# Patient Record
Sex: FEMALE | Race: WHITE | NOT HISPANIC OR LATINO | Employment: FULL TIME | URBAN - METROPOLITAN AREA
[De-identification: names, ages, dates, MRNs, and addresses within clinical notes are randomized per-mention and may not be internally consistent; named-entity substitution may affect disease eponyms.]

---

## 2017-03-19 ENCOUNTER — COMMUNICATION - HEALTHEAST (OUTPATIENT)
Dept: INTERNAL MEDICINE | Facility: CLINIC | Age: 47
End: 2017-03-19

## 2017-03-19 DIAGNOSIS — F41.9 ANXIETY: ICD-10-CM

## 2017-04-22 ENCOUNTER — COMMUNICATION - HEALTHEAST (OUTPATIENT)
Dept: INTERNAL MEDICINE | Facility: CLINIC | Age: 47
End: 2017-04-22

## 2017-04-22 DIAGNOSIS — I10 ESSENTIAL HYPERTENSION: ICD-10-CM

## 2017-05-27 ENCOUNTER — COMMUNICATION - HEALTHEAST (OUTPATIENT)
Dept: INTERNAL MEDICINE | Facility: CLINIC | Age: 47
End: 2017-05-27

## 2017-05-27 DIAGNOSIS — I10 UNSPECIFIED ESSENTIAL HYPERTENSION: ICD-10-CM

## 2017-06-19 ENCOUNTER — COMMUNICATION - HEALTHEAST (OUTPATIENT)
Dept: INTERNAL MEDICINE | Facility: CLINIC | Age: 47
End: 2017-06-19

## 2017-06-19 DIAGNOSIS — F41.9 ANXIETY: ICD-10-CM

## 2017-07-13 ENCOUNTER — OFFICE VISIT - HEALTHEAST (OUTPATIENT)
Dept: INTERNAL MEDICINE | Facility: CLINIC | Age: 47
End: 2017-07-13

## 2017-07-13 DIAGNOSIS — F41.9 ANXIETY: ICD-10-CM

## 2017-07-13 DIAGNOSIS — E78.00 ELEVATED CHOLESTEROL: ICD-10-CM

## 2017-07-13 DIAGNOSIS — I10 ESSENTIAL HYPERTENSION: ICD-10-CM

## 2017-07-13 DIAGNOSIS — I10 UNSPECIFIED ESSENTIAL HYPERTENSION: ICD-10-CM

## 2017-07-13 LAB
CHOLEST SERPL-MCNC: 257 MG/DL
FASTING STATUS PATIENT QL REPORTED: ABNORMAL
HDLC SERPL-MCNC: 50 MG/DL
LDLC SERPL CALC-MCNC: 168 MG/DL
TRIGL SERPL-MCNC: 196 MG/DL

## 2017-08-31 ENCOUNTER — COMMUNICATION - HEALTHEAST (OUTPATIENT)
Dept: INTERNAL MEDICINE | Facility: CLINIC | Age: 47
End: 2017-08-31

## 2017-12-08 ENCOUNTER — COMMUNICATION - HEALTHEAST (OUTPATIENT)
Dept: INTERNAL MEDICINE | Facility: CLINIC | Age: 47
End: 2017-12-08

## 2017-12-08 DIAGNOSIS — F41.9 ANXIETY: ICD-10-CM

## 2018-03-07 ENCOUNTER — COMMUNICATION - HEALTHEAST (OUTPATIENT)
Dept: INTERNAL MEDICINE | Facility: CLINIC | Age: 48
End: 2018-03-07

## 2018-03-07 DIAGNOSIS — F41.9 ANXIETY: ICD-10-CM

## 2018-05-23 ENCOUNTER — HOSPITAL ENCOUNTER (OUTPATIENT)
Dept: MAMMOGRAPHY | Facility: CLINIC | Age: 48
Discharge: HOME | End: 2018-05-23
Attending: INTERNAL MEDICINE

## 2018-05-23 DIAGNOSIS — Z12.31 VISIT FOR SCREENING MAMMOGRAM: ICD-10-CM

## 2018-06-09 ENCOUNTER — COMMUNICATION - HEALTHEAST (OUTPATIENT)
Dept: INTERNAL MEDICINE | Facility: CLINIC | Age: 48
End: 2018-06-09

## 2018-06-09 DIAGNOSIS — F41.9 ANXIETY: ICD-10-CM

## 2018-06-29 ENCOUNTER — COMMUNICATION - HEALTHEAST (OUTPATIENT)
Dept: INTERNAL MEDICINE | Facility: CLINIC | Age: 48
End: 2018-06-29

## 2018-06-29 DIAGNOSIS — I10 ESSENTIAL HYPERTENSION: ICD-10-CM

## 2018-08-13 ENCOUNTER — RECORDS - HEALTHEAST (OUTPATIENT)
Dept: ADMINISTRATIVE | Facility: OTHER | Age: 48
End: 2018-08-13

## 2018-08-14 ENCOUNTER — COMMUNICATION - HEALTHEAST (OUTPATIENT)
Dept: INTERNAL MEDICINE | Facility: CLINIC | Age: 48
End: 2018-08-14

## 2018-08-14 DIAGNOSIS — I10 ESSENTIAL HYPERTENSION: ICD-10-CM

## 2018-09-02 ENCOUNTER — COMMUNICATION - HEALTHEAST (OUTPATIENT)
Dept: INTERNAL MEDICINE | Facility: CLINIC | Age: 48
End: 2018-09-02

## 2018-09-02 DIAGNOSIS — F41.9 ANXIETY: ICD-10-CM

## 2018-10-02 ENCOUNTER — COMMUNICATION - HEALTHEAST (OUTPATIENT)
Dept: INTERNAL MEDICINE | Facility: CLINIC | Age: 48
End: 2018-10-02

## 2018-10-02 DIAGNOSIS — I10 ESSENTIAL HYPERTENSION: ICD-10-CM

## 2018-11-07 ENCOUNTER — COMMUNICATION - HEALTHEAST (OUTPATIENT)
Dept: INTERNAL MEDICINE | Facility: CLINIC | Age: 48
End: 2018-11-07

## 2018-11-07 DIAGNOSIS — I10 ESSENTIAL HYPERTENSION: ICD-10-CM

## 2018-11-27 ENCOUNTER — COMMUNICATION - HEALTHEAST (OUTPATIENT)
Dept: INTERNAL MEDICINE | Facility: CLINIC | Age: 48
End: 2018-11-27

## 2018-11-27 DIAGNOSIS — F41.9 ANXIETY: ICD-10-CM

## 2018-12-12 ENCOUNTER — OFFICE VISIT - HEALTHEAST (OUTPATIENT)
Dept: INTERNAL MEDICINE | Facility: CLINIC | Age: 48
End: 2018-12-12

## 2018-12-12 DIAGNOSIS — I10 ESSENTIAL HYPERTENSION: ICD-10-CM

## 2018-12-12 DIAGNOSIS — F41.9 ANXIETY: ICD-10-CM

## 2018-12-12 DIAGNOSIS — E55.9 VITAMIN D DEFICIENCY: ICD-10-CM

## 2018-12-12 DIAGNOSIS — Z00.00 ROUTINE GENERAL MEDICAL EXAMINATION AT A HEALTH CARE FACILITY: ICD-10-CM

## 2018-12-12 DIAGNOSIS — Z82.49 FAMILY HISTORY OF ABDOMINAL AORTIC ANEURYSM (AAA): ICD-10-CM

## 2018-12-12 DIAGNOSIS — Z88.9 MULTIPLE ALLERGIES: ICD-10-CM

## 2018-12-12 DIAGNOSIS — E66.3 OVERWEIGHT (BMI 25.0-29.9): ICD-10-CM

## 2018-12-12 DIAGNOSIS — E78.2 MIXED HYPERLIPIDEMIA: ICD-10-CM

## 2018-12-12 ASSESSMENT — MIFFLIN-ST. JEOR: SCORE: 1401.85

## 2018-12-13 ENCOUNTER — HOSPITAL ENCOUNTER (OUTPATIENT)
Dept: ULTRASOUND IMAGING | Facility: CLINIC | Age: 48
Discharge: HOME | End: 2018-12-13
Attending: INTERNAL MEDICINE

## 2018-12-13 DIAGNOSIS — Z82.49 FAMILY HISTORY OF ABDOMINAL AORTIC ANEURYSM (AAA): ICD-10-CM

## 2019-01-04 ENCOUNTER — AMBULATORY - HEALTHEAST (OUTPATIENT)
Dept: NURSING | Facility: CLINIC | Age: 49
End: 2019-01-04

## 2019-01-05 ENCOUNTER — COMMUNICATION - HEALTHEAST (OUTPATIENT)
Dept: INTERNAL MEDICINE | Facility: CLINIC | Age: 49
End: 2019-01-05

## 2019-01-22 ENCOUNTER — OFFICE VISIT - HEALTHEAST (OUTPATIENT)
Dept: FAMILY MEDICINE | Facility: CLINIC | Age: 49
End: 2019-01-22

## 2019-01-22 ENCOUNTER — AMBULATORY - HEALTHEAST (OUTPATIENT)
Dept: LAB | Facility: CLINIC | Age: 49
End: 2019-01-22

## 2019-01-22 DIAGNOSIS — I10 ESSENTIAL HYPERTENSION: ICD-10-CM

## 2019-01-22 DIAGNOSIS — E55.9 VITAMIN D DEFICIENCY: ICD-10-CM

## 2019-01-22 DIAGNOSIS — E78.2 MIXED HYPERLIPIDEMIA: ICD-10-CM

## 2019-01-22 DIAGNOSIS — Z00.00 ROUTINE GENERAL MEDICAL EXAMINATION AT A HEALTH CARE FACILITY: ICD-10-CM

## 2019-01-22 DIAGNOSIS — E66.3 OVERWEIGHT (BMI 25.0-29.9): ICD-10-CM

## 2019-01-22 DIAGNOSIS — R63.5 ABNORMAL WEIGHT GAIN: ICD-10-CM

## 2019-01-22 DIAGNOSIS — Z71.9 HEALTH EDUCATION: ICD-10-CM

## 2019-01-22 LAB
ALBUMIN SERPL-MCNC: 4.2 G/DL (ref 3.5–5)
ALP SERPL-CCNC: 64 U/L (ref 45–120)
ALT SERPL W P-5'-P-CCNC: 26 U/L (ref 0–45)
ANION GAP SERPL CALCULATED.3IONS-SCNC: 15 MMOL/L (ref 5–18)
AST SERPL W P-5'-P-CCNC: 19 U/L (ref 0–40)
BILIRUB SERPL-MCNC: 0.5 MG/DL (ref 0–1)
BUN SERPL-MCNC: 17 MG/DL (ref 8–22)
CALCIUM SERPL-MCNC: 9.7 MG/DL (ref 8.5–10.5)
CHLORIDE BLD-SCNC: 97 MMOL/L (ref 98–107)
CHOLEST SERPL-MCNC: 277 MG/DL
CO2 SERPL-SCNC: 26 MMOL/L (ref 22–31)
CREAT SERPL-MCNC: 0.69 MG/DL (ref 0.6–1.1)
FASTING STATUS PATIENT QL REPORTED: YES
GFR SERPL CREATININE-BSD FRML MDRD: >60 ML/MIN/1.73M2
GLUCOSE BLD-MCNC: 87 MG/DL (ref 70–125)
HBA1C MFR BLD: 5.6 % (ref 3.5–6)
HDLC SERPL-MCNC: 63 MG/DL
LDLC SERPL CALC-MCNC: 191 MG/DL
POTASSIUM BLD-SCNC: 3.4 MMOL/L (ref 3.5–5)
PROT SERPL-MCNC: 7.3 G/DL (ref 6–8)
SODIUM SERPL-SCNC: 138 MMOL/L (ref 136–145)
TRIGL SERPL-MCNC: 113 MG/DL
TSH SERPL DL<=0.005 MIU/L-ACNC: 2.4 UIU/ML (ref 0.3–5)
VIT B12 SERPL-MCNC: 343 PG/ML (ref 213–816)

## 2019-01-22 ASSESSMENT — MIFFLIN-ST. JEOR: SCORE: 1401.96

## 2019-01-23 LAB
25(OH)D3 SERPL-MCNC: 34 NG/ML (ref 30–80)
25(OH)D3 SERPL-MCNC: 34 NG/ML (ref 30–80)

## 2019-02-05 ENCOUNTER — COMMUNICATION - HEALTHEAST (OUTPATIENT)
Dept: INTERNAL MEDICINE | Facility: CLINIC | Age: 49
End: 2019-02-05

## 2019-02-05 DIAGNOSIS — I10 ESSENTIAL HYPERTENSION: ICD-10-CM

## 2019-02-08 ENCOUNTER — COMMUNICATION - HEALTHEAST (OUTPATIENT)
Dept: FAMILY MEDICINE | Facility: CLINIC | Age: 49
End: 2019-02-08

## 2019-02-25 ENCOUNTER — OFFICE VISIT - HEALTHEAST (OUTPATIENT)
Dept: FAMILY MEDICINE | Facility: CLINIC | Age: 49
End: 2019-02-25

## 2019-02-25 DIAGNOSIS — I10 ESSENTIAL HYPERTENSION: ICD-10-CM

## 2019-02-25 DIAGNOSIS — E78.2 MIXED HYPERLIPIDEMIA: ICD-10-CM

## 2019-02-25 ASSESSMENT — MIFFLIN-ST. JEOR: SCORE: 1405.59

## 2019-04-01 ENCOUNTER — OFFICE VISIT - HEALTHEAST (OUTPATIENT)
Dept: FAMILY MEDICINE | Facility: CLINIC | Age: 49
End: 2019-04-01

## 2019-04-01 DIAGNOSIS — E78.2 MIXED HYPERLIPIDEMIA: ICD-10-CM

## 2019-04-01 DIAGNOSIS — I10 ESSENTIAL HYPERTENSION: ICD-10-CM

## 2019-04-01 ASSESSMENT — MIFFLIN-ST. JEOR: SCORE: 1376.1

## 2019-10-16 ENCOUNTER — COMMUNICATION - HEALTHEAST (OUTPATIENT)
Dept: FAMILY MEDICINE | Facility: CLINIC | Age: 49
End: 2019-10-16

## 2019-11-21 ENCOUNTER — COMMUNICATION - HEALTHEAST (OUTPATIENT)
Dept: INTERNAL MEDICINE | Facility: CLINIC | Age: 49
End: 2019-11-21

## 2019-11-21 DIAGNOSIS — F41.9 ANXIETY: ICD-10-CM

## 2019-11-23 ENCOUNTER — COMMUNICATION - HEALTHEAST (OUTPATIENT)
Dept: INTERNAL MEDICINE | Facility: CLINIC | Age: 49
End: 2019-11-23

## 2019-11-23 DIAGNOSIS — I10 ESSENTIAL HYPERTENSION: ICD-10-CM

## 2019-11-25 RX ORDER — LISINOPRIL 10 MG/1
TABLET ORAL
Qty: 90 TABLET | Refills: 3 | Status: SHIPPED | OUTPATIENT
Start: 2019-11-25

## 2020-01-16 ENCOUNTER — COMMUNICATION - HEALTHEAST (OUTPATIENT)
Dept: FAMILY MEDICINE | Facility: CLINIC | Age: 50
End: 2020-01-16

## 2020-01-27 ENCOUNTER — COMMUNICATION - HEALTHEAST (OUTPATIENT)
Dept: INTERNAL MEDICINE | Facility: CLINIC | Age: 50
End: 2020-01-27

## 2020-01-27 ENCOUNTER — OFFICE VISIT - HEALTHEAST (OUTPATIENT)
Dept: SLEEP MEDICINE | Facility: CLINIC | Age: 50
End: 2020-01-27

## 2020-01-27 DIAGNOSIS — E66.09 CLASS 1 OBESITY DUE TO EXCESS CALORIES WITHOUT SERIOUS COMORBIDITY WITH BODY MASS INDEX (BMI) OF 31.0 TO 31.9 IN ADULT: ICD-10-CM

## 2020-01-27 DIAGNOSIS — I10 BENIGN ESSENTIAL HYPERTENSION: ICD-10-CM

## 2020-01-27 DIAGNOSIS — E66.811 CLASS 1 OBESITY DUE TO EXCESS CALORIES WITHOUT SERIOUS COMORBIDITY WITH BODY MASS INDEX (BMI) OF 31.0 TO 31.9 IN ADULT: ICD-10-CM

## 2020-01-27 DIAGNOSIS — F41.9 ANXIETY: ICD-10-CM

## 2020-01-27 DIAGNOSIS — G25.81 RESTLESS LEGS SYNDROME: ICD-10-CM

## 2020-01-27 DIAGNOSIS — I10 ESSENTIAL HYPERTENSION: ICD-10-CM

## 2020-01-27 DIAGNOSIS — R06.83 SNORING: ICD-10-CM

## 2020-01-27 DIAGNOSIS — G47.10 HYPERSOMNIA, UNSPECIFIED: ICD-10-CM

## 2020-01-27 ASSESSMENT — MIFFLIN-ST. JEOR: SCORE: 1421.01

## 2020-01-28 ENCOUNTER — COMMUNICATION - HEALTHEAST (OUTPATIENT)
Dept: FAMILY MEDICINE | Facility: CLINIC | Age: 50
End: 2020-01-28

## 2020-01-28 ENCOUNTER — COMMUNICATION - HEALTHEAST (OUTPATIENT)
Dept: INTERNAL MEDICINE | Facility: CLINIC | Age: 50
End: 2020-01-28

## 2020-01-28 ENCOUNTER — AMBULATORY - HEALTHEAST (OUTPATIENT)
Dept: INTERNAL MEDICINE | Facility: CLINIC | Age: 50
End: 2020-01-28

## 2020-01-28 DIAGNOSIS — I10 ESSENTIAL HYPERTENSION: ICD-10-CM

## 2020-01-28 DIAGNOSIS — F41.9 ANXIETY: ICD-10-CM

## 2020-01-28 RX ORDER — VENLAFAXINE HYDROCHLORIDE 150 MG/1
150 CAPSULE, EXTENDED RELEASE ORAL DAILY
Qty: 90 CAPSULE | Refills: 3 | Status: ON HOLD | OUTPATIENT
Start: 2020-01-28 | End: 2024-07-30

## 2020-01-28 RX ORDER — CHLORTHALIDONE 25 MG/1
25 TABLET ORAL DAILY
Qty: 90 TABLET | Refills: 3 | Status: SHIPPED | OUTPATIENT
Start: 2020-01-28

## 2020-03-02 ENCOUNTER — RECORDS - HEALTHEAST (OUTPATIENT)
Dept: SLEEP MEDICINE | Facility: CLINIC | Age: 50
End: 2020-03-02

## 2020-03-02 ENCOUNTER — RECORDS - HEALTHEAST (OUTPATIENT)
Dept: ADMINISTRATIVE | Facility: OTHER | Age: 50
End: 2020-03-02

## 2020-03-02 DIAGNOSIS — G47.10 HYPERSOMNIA, UNSPECIFIED: ICD-10-CM

## 2020-03-02 DIAGNOSIS — R06.83 SNORING: ICD-10-CM

## 2020-03-06 ENCOUNTER — COMMUNICATION - HEALTHEAST (OUTPATIENT)
Dept: SLEEP MEDICINE | Facility: CLINIC | Age: 50
End: 2020-03-06

## 2020-03-09 ENCOUNTER — COMMUNICATION - HEALTHEAST (OUTPATIENT)
Dept: SLEEP MEDICINE | Facility: CLINIC | Age: 50
End: 2020-03-09

## 2020-03-09 DIAGNOSIS — G47.33 OBSTRUCTIVE SLEEP APNEA: ICD-10-CM

## 2020-03-12 DIAGNOSIS — G47.33 OSA (OBSTRUCTIVE SLEEP APNEA): Primary | ICD-10-CM

## 2020-03-16 DIAGNOSIS — G47.33 OBSTRUCTIVE SLEEP APNEA: Primary | ICD-10-CM

## 2020-04-01 ENCOUNTER — COMMUNICATION - HEALTHEAST (OUTPATIENT)
Dept: SLEEP MEDICINE | Facility: CLINIC | Age: 50
End: 2020-04-01

## 2020-04-01 DIAGNOSIS — G47.33 OBSTRUCTIVE SLEEP APNEA: ICD-10-CM

## 2020-05-06 ENCOUNTER — OFFICE VISIT - HEALTHEAST (OUTPATIENT)
Dept: SLEEP MEDICINE | Facility: CLINIC | Age: 50
End: 2020-05-06

## 2020-05-06 DIAGNOSIS — G47.10 HYPERSOMNIA: ICD-10-CM

## 2020-05-06 DIAGNOSIS — G47.33 OBSTRUCTIVE SLEEP APNEA: ICD-10-CM

## 2020-05-07 ENCOUNTER — AMBULATORY - HEALTHEAST (OUTPATIENT)
Dept: SLEEP MEDICINE | Facility: CLINIC | Age: 50
End: 2020-05-07

## 2021-05-27 NOTE — PROGRESS NOTES
ASSESSMENT:  1. Hypertension     2. Mixed hyperlipidemia     3. BMI 30.0-30.9,adult           PLAN:  Cuca is seen results and is feeling like she is trying to get on track.  We discussed the importance of exercise and a combination of resistance and cardiovascular exercise and starting to incorporate that in in the evidence basis for that.  We discussed focusing on adequate protein low carbohydrate diet but making sure that she has enough variability and especially high-fiber foods such as vegetables and some fruit.F/U in one month.  Recommend increasing movement throughout the day--sitting less, moving more.  Will increase activity over time to reach a goal of at least 150 minutes of moderate exercise each week.  Behavior modification:  Cognitive restructuring exercises, journaling stressors, triggers for food cravings.  Dietary Intervention:  Reduced calorie, reduced carbohydrates, whole food diet.  Greater than 50% of this 15 minute visit was spent in counseling regarding patient's obesity, medications, dietary, exercise, and behavior modification recommendations.      SUBJECTIVE  Patient presents for treatment of chronic, comorbid conditions using intensive therapeutic lifestyle interventions including nutrition, physical activity, and behavior management.  The patient had a much more successful past month with weight loss.  She lost just over 3% of her weight this month.  She is very pleased with this.  The patient continues to feel that her appetite is under good control and she is not having excessive cravings.  She does not feel that medication would be beneficial.  She feels she is getting into a routine both with nutrition and exercise.    Are you experiencing any side effects to the medications:  N/A  Hunger control:  good  Exercise was discussed: yes  Taking supplements:  yes  Discussed journaling food:  yes  Patient is pleased with the current results:  yes  The patient is following the nutrition  plan:  yes  Barriers to losing weight:  None identified    Patient Active Problem List   Diagnosis     Iron Deficiency Anemia Secondary To Inadequate Dietary Iron Intake     Eczema     Insomnia     Perimenopause     Arthritis     Hypertension     Mixed hyperlipidemia     Vitamin D Deficiency     Allergies     Chronic Sinusitis     Anxiety     BMI 30.0-30.9,adult       Current Outpatient Medications on File Prior to Visit   Medication Sig Dispense Refill     b complex vitamins tablet Take 1 tablet by mouth daily.       chlorthalidone (HYGROTEN) 25 MG tablet Take 1 tablet (25 mg total) by mouth daily. 90 tablet 3     ERGOCALCIFEROL, VITAMIN D2, (VITAMIN D2 ORAL) Take 1 capsule by mouth daily.       lisinopril (PRINIVIL,ZESTRIL) 10 MG tablet Take 1 tablet (10 mg total) by mouth daily. 90 tablet 3     MULTIVITAMIN ORAL Take 1 tablet by mouth daily.       OMEGA-3S/DHA/EPA/FISH OIL (OMEGA 3 ORAL) Take 1 capsule by mouth daily.       venlafaxine (EFFEXOR-XR) 150 MG 24 hr capsule Take 1 capsule (150 mg total) by mouth daily. 90 capsule 3     No current facility-administered medications on file prior to visit.        The following portions of the patient's history were reviewed and updated as appropriate: allergies, current medications, past family history, past medical history, past social history, past surgical history and problem list.    Review of Systems  Pertinent items are noted in HPI.        OBJECTIVE:  Vitals:    04/01/19 1600   BP: 122/74     Initial Weight: 176.8 lbs  Weight: 171 lb 1.6 oz (77.6 kg)  Weight loss from initial: 5.7  % Weight loss: 3.22 %    Body mass index is 29.37 kg/m .  General:  Patient is alert, pleasant, no distress.  Patient is obese.       .  This note has been dictated using voice recognition software. Any grammatical or context distortions are unintentional and inherent to the software

## 2021-05-30 ENCOUNTER — RECORDS - HEALTHEAST (OUTPATIENT)
Dept: ADMINISTRATIVE | Facility: CLINIC | Age: 51
End: 2021-05-30

## 2021-05-31 VITALS — WEIGHT: 177.44 LBS | BODY MASS INDEX: 29.99 KG/M2

## 2021-06-02 ENCOUNTER — RECORDS - HEALTHEAST (OUTPATIENT)
Dept: ADMINISTRATIVE | Facility: CLINIC | Age: 51
End: 2021-06-02

## 2021-06-02 VITALS — BODY MASS INDEX: 30.19 KG/M2 | HEIGHT: 64 IN | WEIGHT: 176.8 LBS

## 2021-06-02 VITALS — HEIGHT: 64 IN | WEIGHT: 171.1 LBS | BODY MASS INDEX: 29.21 KG/M2

## 2021-06-02 VITALS — WEIGHT: 177.6 LBS | HEIGHT: 64 IN | BODY MASS INDEX: 30.32 KG/M2

## 2021-06-02 VITALS — WEIGHT: 175.9 LBS | HEIGHT: 64 IN | BODY MASS INDEX: 30.03 KG/M2

## 2021-06-03 NOTE — TELEPHONE ENCOUNTER
Former patient of Dr RICHELLE Flores & has not established care with another provider.  Please assign refill request to covering provider per Clinic standard process.-p    Refill Approved/Needs MD authorization    Rx renewed per Medication Renewal Policy. Medication was last renewed on 12/12/2018 with 3 refills.  Last office visit: 4/1/2019 with Dr JUDAH Reese @ [facility] (weight loss program)  Last office visit with PCP Dr RICHELLE Flores = 12/12/2018, no longer with [facility] IM clinic.   Will forward request to [facility] IM clinic.     Margie Mcdonnell, Care Connection Triage/Med Refill 11/21/2019     Requested Prescriptions   Pending Prescriptions Disp Refills     venlafaxine (EFFEXOR-XR) 150 MG 24 hr capsule [Pharmacy Med Name: VENLAFAXINE ER 150MG CAPSULES] 90 capsule 0     Sig: TAKE ONE CAPSULE BY MOUTH DAILY       Venlafaxine/Desvenlafaxine Refill Protocol Passed - 11/21/2019  3:28 AM        Passed - LFT or AST or ALT in last year     Albumin   Date Value Ref Range Status   01/22/2019 4.2 3.5 - 5.0 g/dL Final     Bilirubin, Total   Date Value Ref Range Status   01/22/2019 0.5 0.0 - 1.0 mg/dL Final     Alkaline Phosphatase   Date Value Ref Range Status   01/22/2019 64 45 - 120 U/L Final     AST   Date Value Ref Range Status   01/22/2019 19 0 - 40 U/L Final     ALT   Date Value Ref Range Status   01/22/2019 26 0 - 45 U/L Final     Protein, Total   Date Value Ref Range Status   01/22/2019 7.3 6.0 - 8.0 g/dL Final                Passed - Fasting lipid cascade in last year     Cholesterol   Date Value Ref Range Status   01/22/2019 277 (H) <=199 mg/dL Final     Triglycerides   Date Value Ref Range Status   01/22/2019 113 <=149 mg/dL Final     HDL Cholesterol   Date Value Ref Range Status   01/22/2019 63 >=50 mg/dL Final     LDL Calculated   Date Value Ref Range Status   01/22/2019 191 (H) <=129 mg/dL Final     Patient Fasting > 8hrs?   Date Value Ref Range Status   01/22/2019 Yes  Final             Passed - PCP or prescribing provider visit in  last year     Last office visit with prescriber/PCP: 7/13/2017 Alicia Flores MD OR same dept: Visit date not found OR same specialty: 7/13/2017 Alicia Flores MD  Last physical: 12/12/2018 Last MTM visit: Visit date not found   Next visit within 3 mo: Visit date not found  Next physical within 3 mo: Visit date not found  Prescriber OR PCP: Alicia Flores MD  Last diagnosis associated with med order: 1. Anxiety  - venlafaxine (EFFEXOR-XR) 150 MG 24 hr capsule [Pharmacy Med Name: VENLAFAXINE ER 150MG CAPSULES]; TAKE ONE CAPSULE BY MOUTH DAILY  Dispense: 90 capsule; Refill: 0    If protocol passes may refill for 12 months if within 3 months of last provider visit (or a total of 15 months).             Passed - Blood Pressure in last year     BP Readings from Last 1 Encounters:   04/01/19 122/74

## 2021-06-03 NOTE — TELEPHONE ENCOUNTER
Refill Approved    Rx renewed per Medication Renewal Policy. Medication was last renewed on 12/12/18  OV 4/1/19.    Isabel Izquierdo, Care Connection Triage/Med Refill 11/23/2019     Requested Prescriptions   Pending Prescriptions Disp Refills     lisinopril (PRINIVIL,ZESTRIL) 10 MG tablet [Pharmacy Med Name: LISINOPRIL 10MG TABLETS] 90 tablet 0     Sig: TAKE 1 TABLET(10 MG) BY MOUTH DAILY       Ace Inhibitors Refill Protocol Passed - 11/23/2019 10:46 AM        Passed - PCP or prescribing provider visit in past 12 months       Last office visit with prescriber/PCP: 7/13/2017 Alicia Flores MD OR same dept: Visit date not found OR same specialty: 7/13/2017 Alicia Flores MD  Last physical: 12/12/2018 Last MTM visit: Visit date not found   Next visit within 3 mo: Visit date not found  Next physical within 3 mo: Visit date not found  Prescriber OR PCP: Alicia Flores MD  Last diagnosis associated with med order: 1. Essential hypertension  - lisinopril (PRINIVIL,ZESTRIL) 10 MG tablet [Pharmacy Med Name: LISINOPRIL 10MG TABLETS]; TAKE 1 TABLET(10 MG) BY MOUTH DAILY  Dispense: 90 tablet; Refill: 0    If protocol passes may refill for 12 months if within 3 months of last provider visit (or a total of 15 months).             Passed - Serum Potassium in past 12 months     Lab Results   Component Value Date    Potassium 3.4 (L) 01/22/2019             Passed - Blood pressure filed in past 12 months     BP Readings from Last 1 Encounters:   04/01/19 122/74             Passed - Serum Creatinine in past 12 months     Creatinine   Date Value Ref Range Status   01/22/2019 0.69 0.60 - 1.10 mg/dL Final

## 2021-06-04 VITALS
BODY MASS INDEX: 30.9 KG/M2 | OXYGEN SATURATION: 99 % | DIASTOLIC BLOOD PRESSURE: 89 MMHG | SYSTOLIC BLOOD PRESSURE: 129 MMHG | WEIGHT: 181 LBS | HEART RATE: 80 BPM | HEIGHT: 64 IN

## 2021-06-05 NOTE — TELEPHONE ENCOUNTER
Former patient of Dr. Alicia Flores & has not established care with another provider.  Please assign refill request to covering provider per Clinic standard process.

## 2021-06-05 NOTE — PATIENT INSTRUCTIONS - HE
Supplemental iron therapy    I recommend you start taking supplemental iron.  This can be purchased over the counter and is typically called ferrous sulfate or ferrous gluconate, 325 mg tabs (65 mg elemental iron).  For maximal absorption, take one tablet on an empty stomach with 500 mg vitamin C (or a cup of orange juice).

## 2021-06-05 NOTE — PROGRESS NOTES
[facility]  Outpatient Sleep Medicine Consultation      Name: Cuca Sun        MRN# [id number]  Age: 49 y.o.         YOB: 1970    Date of Consultation: 1/27/2020  Consultation is requested by: Alicia Flores MD  [address] [place] [address]  Primary care provider: Alicia Flores MD        Assessment and Plan:  1. Snoring  Patient is being evaluated for Obstructive Sleep Apnea (MECHELLE).  Risk factors for MECHELLE include:  snoring, excessive daytime sleepiness/subjective tiredness, high blood pressure, obesity (STOP BANG score = 4/8).  Recommend HST since patient is high risk for MECHELLE without any relevant comorbid conditions.  Counseling included a comprehensive review of diagnostic and therapeutic strategies as well as risks of inadequate therapy.  [facility]    2. Restless legs syndrome  Patient declines prescription medication for restless leg syndrome.  I discussed the possibility that periodic limb movement disorder may contribute to sleep fragmentation and daytime sleepiness.  The home sleep apnea test cannot assess for leg movements and associated arousals.  An in lab polysomnogram study may be considered for persistent sleepiness.  In the meantime, I recommended supplemental iron for several months to determine if this improves her symptoms.    3. Hypersomnia, unspecified  The patient was strongly advised to avoid driving, operating any heavy machinery or engaging in other hazardous situations while drowsy or sleepy.  Patient was counseled on the importance of driving while alert and to pull over if drowsy.      4. Benign essential hypertension  MECHELLE is an independent risk factor for hypertension; risk for hypertension is related to severity of sleep apnea and episodic nocturnal hypoxemia.  Untreated MECHELLE can cause loss of blood pressure dipping during nighttime sleep.  PAP therapy can improve blood pressure  control in individuals with co-existing MECHELLE and hypertension.    5. Class 1 obesity due to excess calories without serious comorbidity with body mass index (BMI) of 31.0 to 31.9 in adult  Patients with obesity are at higher risk for sleep apnea due to fat deposition in the posterior airway and tongue.  Sleep disruption associated with untreated sleep disorders (including, but not limited to sleep apnea) can cause hormonal disruption that predisposes individuals to gain weight.  Weight loss can lead to improvement in sleep apnea severity and sleep quality.  Nocturnal hypoventilation may be a consideration in some individuals with obesity.    Chief Complaint: Daytime sleepiness      History of Present Illness:    Cuca Sun is a 49 y.o. female who I am seeing for the first time in my adult sleep medicine clinic.  She reports being evaluated approximately 4 years ago for daytime sleepiness.  At that time she deferred testing for sleep disorders due to her desire to lose weight.  She has not been able to achieve her weight loss goals and her sleep symptoms have persisted.  She endorses snoring, daytime sleepiness, and unrefreshing sleep.  Her score on the [place] Sleepiness Scale is elevated at 11 out of 24.    Typical bedtime is between 9 PM and midnight.  Sleep initiation is variable from 10 minutes to 1 hour.  Awakenings are usually 1-2 times per night.  Her final wake up time is at 5:40 AM on weekdays and 7:53 AM on weekends.  She describes her sleep quality as okay.  She consumes 2 cups of coffee daily.  She has difficulty with sleep maintenance.  When she awakens she has the feeling that her airway is swollen.  She endorses difficulty with lying supine and undergoing a cosmetic procedure such as eyebrow waxing.    She describes a clinical history consistent with a diagnosis of restless leg syndrome.  She has a bothersome sensation in her legs which is more prominent at night.  Stretching or holding an  isotonic pose helps alleviate the sensation.  Remaining still causes the sensation to escalate.  These feelings were worse when she was pregnant and she noticed them on long car rides.  She does not want medications for relief since behavioral mechanisms seem to be managing the condition adequately.  She does report a history of mild anemia.    She is diagnosed with hypertension and takes lisinopril.  She reports that her blood pressures have been controlled.  Anxiety is managed with Effexor.  She is engaging in a weight loss program.  Surgical history includes 2 surgeries on her knee and a laparoscopic cholecystectomy.  There were no complications reported in the perioperative period.            Medications:      Current Outpatient Medications   Medication Sig Dispense Refill     b complex vitamins tablet Take 1 tablet by mouth daily.       chlorthalidone (HYGROTEN) 25 MG tablet Take 1 tablet (25 mg total) by mouth daily. 90 tablet 3     ERGOCALCIFEROL, VITAMIN D2, (VITAMIN D2 ORAL) Take 1 capsule by mouth daily.       lisinopril (PRINIVIL,ZESTRIL) 10 MG tablet TAKE 1 TABLET(10 MG) BY MOUTH DAILY 90 tablet 3     MULTIVITAMIN ORAL Take 1 tablet by mouth daily.       OMEGA-3S/DHA/EPA/FISH OIL (OMEGA 3 ORAL) Take 1 capsule by mouth daily.       venlafaxine (EFFEXOR-XR) 150 MG 24 hr capsule Take 1 capsule (150 mg total) by mouth daily. 90 capsule 3     venlafaxine (EFFEXOR-XR) 150 MG 24 hr capsule TAKE ONE CAPSULE BY MOUTH DAILY 30 capsule 0     No current facility-administered medications for this visit.          Allergies   Allergen Reactions     Morphine Nausea Only         Past Medical History:    No past medical history on file.      Past Surgical History:    Past Surgical History:   Procedure Laterality Date     WA KNEE SCOPE,DIAGNOSTIC      Description: Arthroscopy Knee Left;  Recorded: 03/25/2009;  Comments: 6-87 [facility] SAXENA LAP,CHOLECYSTECTOMY      Description: Cholecystectomy Laparoscopic;   "Recorded: 03/25/2009;  Comments: 6-07 [facility]         Social History:    Social History     Tobacco Use     Smoking status: Never Smoker     Smokeless tobacco: Never Used   Substance Use Topics     Alcohol use: Not on file         Family History:    Family History   Problem Relation Age of Onset     Breast cancer Mother 60     Aortic aneurysm Mother         arch that was repaired, abdomen rupture on eliquis that was fatal     Atrial fibrillation Mother          Review of Systems:    A complete 10 point review of systems was negative other than HPI or as commented below.      Physical Examination:  /89 (Patient Site: Right Arm, Patient Position: Sitting, Cuff Size: Adult Large)   Pulse 80   Ht 5' 4\" (1.626 m)   Wt 181 lb (82.1 kg)   SpO2 99%   BMI 31.07 kg/m    Neck circumference: 15.5 inches  Constitutional: . Awake, alert, cooperative, in no apparent distress.  Eyes: No icterus.  ENT: Mallampati Class: III.  Tongue:  Large tongue base.  Nose: Nares patent.    Cardiovascular: Regular S1 and S2, no gallops or murmurs.  Neck: Supple, no thyroid enlargement.  Pulmonary:  Chest symmetric, lungs clear bilaterally and no crackles, wheezes or rales.  Extremities:  No pretibial edema.  Skin:  No rash or significant lesions visible.  Gait:  Normal.  Neurologic: Alert, oriented, no focal neurological deficit.  Psychological: Euthymic; affect appropriate.    Data: All pertinent previous laboratory data reviewed.    No results found for: PH  Lab Results   Component Value Date    TSH 2.40 01/22/2019    TSH 1.80 07/13/2017     No components found for: GLC  Lab Results   Component Value Date    HGB 13.2 08/13/2015    HGB 12.9 04/30/2014     Lab Results   Component Value Date    BUN 17 01/22/2019    BUN 17 07/13/2017     Lab Results   Component Value Date    AST 19 01/22/2019    AST 16 07/13/2017    ALT 26 01/22/2019    ALT 18 07/13/2017    ALKPHOS 64 01/22/2019    ALKPHOS 61 07/13/2017     No components found for: " MARIAN, DEBORAH, KEELY MONREAL, UCOC, OPIT, UPCP      Shara Deleon MD  1/27/2020  [place] [address] [facility], [address]  [place]  [address]  [phone]    Copy to: Alicia Flores MD

## 2021-06-05 NOTE — TELEPHONE ENCOUNTER
I am still not quite sure what to do with this.  As a consultant, filling chronic medications as not considered something that I would do.  This should be sent to whoever is covering for Dr. Alicia Flores.

## 2021-06-05 NOTE — TELEPHONE ENCOUNTER
Spoke to patient over the phone regarding a letter that had been mailed out a few months ago regarding new resources available through the Weight Management Program.  Patient notes that she did receive the letter and had no questions at this time regarding resources available.

## 2021-06-05 NOTE — PROGRESS NOTES
Sleep study order, face sheet, signed office visit note, insurance card, photo ID, and consent for service form have been sent to [facility] today 1/27/2020 8:56 AM

## 2021-06-05 NOTE — TELEPHONE ENCOUNTER
Please reach out to patient; she has Dr. Alicia Flores listed as her primary; however, her medication came to me to fill. Has she chosen a new primary? I last saw patient in April for weight loss

## 2021-06-06 NOTE — TELEPHONE ENCOUNTER
Please call the patient to schedule. Please create chart and send to me. Order is in the chart. Thanks.

## 2021-06-07 NOTE — TELEPHONE ENCOUNTER
Order for Durable Medical Equipment was processed and equipment ordered.     DME provider: [facility]    Date Faxed: 4/1/2020    Ordering Provider: Aayush Rodriguez DO    PAP Order Type: New Device Order    Fax Number: [phone]

## 2021-06-07 NOTE — PROGRESS NOTES
"Cuca Sun is a 49 y.o. female who is being evaluated via a billable telephone visit.      The patient has been notified of following:     \"This telephone visit will be conducted via a call between you and your physician/provider. We have found that certain health care needs can be provided without the need for a physical exam.  This service lets us provide the care you need with a short phone conversation.  If a prescription is necessary we can send it directly to your pharmacy.  If lab work is needed we can place an order for that and you can then stop by our lab to have the test done at a later time.    Telephone visits are billed at different rates depending on your insurance coverage. During this emergency period, for some insurers they may be billed the same as an in-person visit.  Please reach out to your insurance provider with any questions.    If during the course of the call the physician/provider feels a telephone visit is not appropriate, you will not be charged for this service.\"    Patient has given verbal consent to a Telephone visit? Yes    What phone number would you like to be contacted at? [phone]    Patient would like to receive their AVS by AVS Preference: Yanet Mendoza LPN    Purpose of Call:    She is a 50 y.o.  female patient who had study that was completed on 03/02/20  which showed that the patient had severe obstructive sleep apnea with an apnea hypopnea index of 64.4 events per hour with the lowest O2 Sat of 78%. The patient states that since starting CPAP therapy, her excessive daytime sleepiness has improved. She also feels that she has more energy compared to before.    Ideal Sleep-Wake Cycle(devoid of societal pressure):    Patient would try to initiate sleep at around 10pm with a sleep latency of up to one hour. The patient would have 1 awakening. Final wake up time is around 6AM.    Compliance Download data for 30 Days:  Pressure setting: APAP 5-20 " cwp  Residual AHI:11.2 events per hour  Leak:minimal  Compliance:100%  Mask Tolerance: Poor  Skin irritation:None    Assessment/Plan:  1. Obstructive sleep apnea     2. Hypersomnia        I reviewed the results of the patient's sleep study and compliance download data with her. I will also narrow her pressure range to 10-18 cwp and have her follow up with me annually.    I have reviewed the note as documented above.  This accurately captures the substance of my conversation with the patient.    Phone call contact time: 12 minutes    Aayush Rodriguez DO  Board Certified in Internal Medicine and Sleep Medicine    Patient verbalized understanding of these issues, agrees with the plan and all questions were answered today. Patient was given an opportuntity to voice any other symptoms or concerns not listed above. Patient did not have any other symptoms or concerns.

## 2021-06-08 NOTE — PROGRESS NOTES
Order for Durable Medical Equipment was processed and equipment ordered.     DME provider: [facility]    Date Faxed: 5/7/2020    Ordering Provider: Aayush Rodriguez DO    PAP Order Type: New Device Order  Setting/Pressure changes    Fax Number: [phone]

## 2021-06-11 NOTE — PROGRESS NOTES
ASSESSMENT and PLAN:  1. Essential hypertension  Well controlled; meds refilled and labs today.  She's going to be working more on diet, exercise and stress reduction.  - lisinopril (PRINIVIL,ZESTRIL) 10 MG tablet; Take 1 tablet (10 mg total) by mouth daily.  Dispense: 90 tablet; Refill: 3  - chlorthalidone (HYGROTEN) 25 MG tablet; TAKE 1 TABLET BY MOUTH DAILY  Dispense: 90 tablet; Refill: 3  - Comprehensive Metabolic Panel  - Lipid Cascade  - Thyroid Cascade    2. Elevated cholesterol  She's not fasting today, but we'll check labs.  - Lipid Cascade    3. Anxiety  Doing well on venlafaxine.  She'd like to continue at the same dose.  Meds were refilled in June.    Medications Discontinued During This Encounter   Medication Reason     lisinopril (PRINIVIL,ZESTRIL) 10 MG tablet Reorder     chlorthalidone (HYGROTEN) 25 MG tablet Reorder       No Follow-up on file.    Patient Instructions   Meds refilled.    Tdap today.    Labs if we have time; otherwise just set up a lab only visit.    Good luck on the new position!      CHIEF COMPLAINT:  Chief Complaint   Patient presents with     Follow-up     Medications       HISTORY OF PRESENT ILLNESS:  Cuca Sun is a 47 y.o. female  presenting to the clinic today for f/u of her HTN and venlafaxine.  She had labs done last May.  She's not fasting today.  Her mom just had open heart surgery for an ascending aorta aneurysm.  She's going to be switch jobs.  Her anxiety is well controlled.  She's struggling w/ trying to lose weight.  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  Her daughter has a boyfriend, so that's been a new stressor.    REVIEW OF SYSTEMS:    All other systems are negative.    PFSH:  Her mom is at [facility]; they have a care conference this afternoon      TOBACCO USE:  History   Smoking Status     Never Smoker   Smokeless Tobacco     Never Used       VITALS:  Vitals:    07/13/17 1341   BP: 118/74   Patient Site: Right Arm   Pulse: 84   Weight: 177  lb 7 oz (80.5 kg)     Wt Readings from Last 3 Encounters:   07/13/17 177 lb 7 oz (80.5 kg)   06/23/16 173 lb (78.5 kg)   05/05/16 168 lb (76.2 kg)         PHYSICAL EXAM:  Constitutional:  Reveals an alert, pleasant adult female.   Vitals:  Noted.   Head: Normocephalic, without obvious abnormality, atraumatic   Lungs: Clear to auscultation bilaterally, respirations unlabored.   Heart: Regular rate and rhythm, S1 and S2 normal, no murmur, rub, or gallop,   Extremities: Extremities normal, atraumatic, no cyanosis or edema   Neurologic: grossly normal     QUALITY MEASURES:  The following high BMI interventions were performed this visit: encouragement to exercise and weight monitoring    MEDICATIONS:  Current Outpatient Prescriptions   Medication Sig Dispense Refill     b complex vitamins tablet Take 1 tablet by mouth daily.       chlorthalidone (HYGROTEN) 25 MG tablet TAKE 1 TABLET BY MOUTH DAILY 90 tablet 3     clobetasol (TEMOVATE) 0.05 % cream Apply 1 application topically 2 (two) times a day.       desonide (DESOWEN) 0.05 % cream Apply 1 application topically 2 (two) times a day.       ERGOCALCIFEROL, VITAMIN D2, (VITAMIN D2 ORAL) Take 1 capsule by mouth daily.       fluticasone (FLONASE) 50 mcg/actuation nasal spray 1 spray in each nostril daily 16 g 3     lisinopril (PRINIVIL,ZESTRIL) 10 MG tablet Take 1 tablet (10 mg total) by mouth daily. 90 tablet 3     MULTIVITAMIN ORAL Take 1 tablet by mouth daily.       OMEGA-3S/DHA/EPA/FISH OIL (OMEGA 3 ORAL) Take 1 capsule by mouth daily.       venlafaxine (EFFEXOR-XR) 150 MG 24 hr capsule Take 1 capsule (150 mg total) by mouth daily. 90 capsule 1     venlafaxine (EFFEXOR-XR) 150 MG 24 hr capsule TAKE ONE CAPSULE BY MOUTH ONCE DAILY 30 capsule 0     No current facility-administered medications for this visit.       Chip Sheldon)

## 2021-06-12 ENCOUNTER — HEALTH MAINTENANCE LETTER (OUTPATIENT)
Age: 51
End: 2021-06-12

## 2021-06-16 PROBLEM — F41.9 ANXIETY: Status: ACTIVE | Noted: 2017-12-12

## 2021-06-18 NOTE — PATIENT INSTRUCTIONS - HE
"Patient Instructions by Aayush Rodriguez DO at 5/6/2020  2:00 PM     Author: Aayush Rodriguez DO Service: -- Author Type: Physician    Filed: 5/6/2020  1:55 PM Encounter Date: 5/6/2020 Status: Signed    : Aayush Rodriguez DO (Physician)         What is sleep apnea?    Sleep apnea is a condition that makes you stop breathing for short periods while you are asleep. There are 2 types of sleep apnea. One is called \"obstructive sleep apnea,\" and the other is called \"central sleep apnea.\"  In obstructive sleep apnea, you stop breathing because your throat narrows or closes (figure 1). In central sleep apnea, you stop breathing because your brain does not send the right signals to your muscles to make you breathe. When people talk about sleep apnea, they are usually referring to obstructive sleep apnea, which is what this article is about.  People with sleep apnea do not know that they stop breathing when they are asleep. But they do sometimes wake up startled or gasping for breath. They also often hear from loved ones that they snore.  What are the symptoms of sleep apnea? -- The main symptoms of sleep apnea are loud snoring, tiredness, and daytime sleepiness. Other symptoms can include:  ?Restless sleep  ?Waking up choking or gasping  ?Morning headaches, dry mouth, or sore throat  ?Waking up often to urinate  ?Waking up feeling unrested or groggy  ?Trouble thinking clearly or remembering things  Some people with sleep apnea don't have symptoms, or they don't know they have them. They might figure that it's normal to be tired or to snore a lot.  Should I see a doctor or nurse? -- Yes. If you think you might have sleep apnea, see your doctor.  Is there a test for sleep apnea? -- Yes. If your doctor or nurse suspects you have sleep apnea, he or she might send you for a \"sleep study.\" Sleep studies can sometimes be done at home, but they are usually done in a sleep lab. For the study, you spend the night in the lab, " "and you are hooked up to different machines that monitor your heart rate, breathing, and other body functions. The results of the test will tell your doctor or nurse if you have the disorder.  Is there anything I can do on my own to help my sleep apnea? -- Yes. Here are some things that might help:  ?Stay off your back when sleeping. (This is not always practical, because people cannot control their position while asleep. Plus, it only helps some people.)  ?Lose weight, if you are overweight  ?Avoid alcohol, because it can make sleep apnea worse  How is sleep apnea treated? -- The most effective treatment for sleep apnea is a device that keeps your airway open while you sleep. Treatment with this device is called \"continuous positive airway pressure,\" or CPAP. People getting CPAP wear a face mask at night that keeps them breathing (figure 2).  If your doctor or nurse recommends a CPAP machine, try to be patient about using it. The mask might seem uncomfortable to wear at first, and the machine might seem noisy, but using the machine can really pay off. People with sleep apnea who use a CPAP machine feel more rested and generally feel better.  There is also another device that you wear in your mouth called an \"oral appliance\" or \"mandibular advancement device.\" It also helps keep your airway open while you sleep.  In rare cases, when nothing else helps, doctors recommend surgery to keep the airway open. Surgery to do this is not always effective, and even when it is, the problem can come back.  Is sleep apnea dangerous? -- It can be. People with sleep apnea do not get good-quality sleep, so they are often tired and not alert. This puts them at risk for car accidents and other types of accidents. Plus, studies show that people with sleep apnea are more likely than others to have high blood pressure, heart attacks, and other serious heart problems. In people with severe sleep apnea, getting treated (for example, with a " CPAP machine) can help prevent some of these problems.    GRAPHICS  Airway in a person with sleep apnea    Normally when a person sleeps, the airway remains open, and air can pass from the nose and mouth to the lungs. In a person with sleep apnea, parts of the throat and mouth drop into the airway and block off the flow of air. This can cause loud snoring and interrupt breathing for short periods.  Graphic 04498 Version 5.0    Continuous positive airway pressure (CPAP) for sleep apnea    The CPAP mask gently blows air into your nose while you sleep. It puts just enough pressure on your airway to keep it from closing. The mask in this picture fits over just the nose. Other CPAP devices have masks that fit over the nose and mouth.

## 2021-06-22 NOTE — PROGRESS NOTES
ASSESSMENT and PLAN:  1. Anxiety  Her anxiety is well controlled on the venlafaxine.  She thinks this may be helping her with possible perimenopausal symptoms as well.  This may be refilled.  - venlafaxine (EFFEXOR-XR) 150 MG 24 hr capsule; Take 1 capsule (150 mg total) by mouth daily.  Dispense: 90 capsule; Refill: 3    2. Essential hypertension  Her blood pressure is elevated today.  She will monitor this.  Lab work is pending.  She is working on weight loss as well as following a whole 30 diet.  She is interested in the ways to wellness program.  He will return for fasting blood work after she has made some positive changes.  We can reassess at that time.  - lisinopril (PRINIVIL,ZESTRIL) 10 MG tablet; Take 1 tablet (10 mg total) by mouth daily.  Dispense: 90 tablet; Refill: 3  - chlorthalidone (HYGROTEN) 25 MG tablet; Take 1 tablet (25 mg total) by mouth daily.  Dispense: 90 tablet; Refill: 3  - Comprehensive Metabolic Panel; Future    3. Multiple allergies  Not specifically addressed today.    4. Vitamin D deficiency  We will repeat her vitamin D level when she returns for labs.  - Vitamin D, Total (25-Hydroxy); Future    5. Mixed hyperlipidemia  She is hopeful that by changing her diet, she can decrease her triglyceride level.  She is going to be returning for fasting blood work.  - Lipid Cascade; Future    6. Routine general medical examination at a health care facility  She wants to see what her B12 level is in addition to her typical blood work.  That will be obtained.  - Vitamin B12; Future    7. Overweight (BMI 25.0-29.9)  She attributes her weight gain to diet changes.  She is also been less active.  She is working on ways to improve that.  I would like to check her thyroid function and A1c when she returns for fasting blood work.  - Thyroid Stimulating Hormone (TSH); Future  - Glycosylated Hemoglobin A1c; Future    8. Family history of abdominal aortic aneurysm (AAA)  Given that both parents had  abdominal aortic aneurysms and her mother also had an aortic arch aneurysm, I think it is reasonable for her to be screened.  If there are any concerns, we could have her see vascular surgery or pursue a CTA for additional evaluation.  - US Abdominal Aorta; Future        Patient Instructions   To set up the ultrasound:  [phone]    Please set up a time to come in for fasting labs.    Take care!    Nia Alcalas!      Orders Placed This Encounter   Procedures     US Abdominal Aorta     Standing Status:   Future     Standing Expiration Date:   2019     Order Specific Question:   Reason for Exam (Describe Symptoms):     Answer:   Mom  of ruptured AAA, father had AAA also     Order Specific Question:   Is the patient pregnant?     Answer:   No     Order Specific Question:   Can the procedure be changed per Radiologist protocol?     Answer:   Yes     Lipid Cascade     Standing Status:   Future     Standing Expiration Date:   2019     Order Specific Question:   Fasting is required?     Answer:   Yes     Vitamin D, Total (25-Hydroxy)     Standing Status:   Future     Standing Expiration Date:   2019     Vitamin B12     Standing Status:   Future     Standing Expiration Date:   2019     Comprehensive Metabolic Panel     Standing Status:   Future     Standing Expiration Date:   2019     Thyroid Stimulating Hormone (TSH)     Standing Status:   Future     Standing Expiration Date:   2019     Glycosylated Hemoglobin A1c     Standing Status:   Future     Standing Expiration Date:   2019     Medications Discontinued During This Encounter   Medication Reason     clobetasol (TEMOVATE) 0.05 % cream Therapy completed     desonide (DESOWEN) 0.05 % cream Therapy completed     fluticasone (FLONASE) 50 mcg/actuation nasal spray Therapy completed     venlafaxine (EFFEXOR-XR) 150 MG 24 hr capsule Reorder     lisinopril (PRINIVIL,ZESTRIL) 10 MG tablet Reorder     chlorthalidone (HYGROTEN)  25 MG tablet Reorder       Return in about 6 months (around 6/12/2019) for Recheck.    ASSESSED PROBLEMS:  Problem List Items Addressed This Visit     Hypertension    Relevant Medications    lisinopril (PRINIVIL,ZESTRIL) 10 MG tablet    chlorthalidone (HYGROTEN) 25 MG tablet    Other Relevant Orders    Comprehensive Metabolic Panel    Mixed hyperlipidemia    Relevant Orders    Lipid Cascade    Vitamin D Deficiency    Relevant Orders    Vitamin D, Total (25-Hydroxy)    Anxiety    Relevant Medications    venlafaxine (EFFEXOR-XR) 150 MG 24 hr capsule      Other Visit Diagnoses     Routine general medical examination at a health care facility    -  Primary    Relevant Orders    Vitamin B12    Multiple allergies        Overweight (BMI 25.0-29.9)        Relevant Orders    Thyroid Stimulating Hormone (TSH)    Glycosylated Hemoglobin A1c    Family history of abdominal aortic aneurysm (AAA)        Relevant Orders    US Abdominal Aorta          CHIEF COMPLAINT:  Chief Complaint   Patient presents with     Annual Exam     No concerns- Not fasting       HISTORY OF PRESENT ILLNESS:  Cuca Sun is a 48 y.o. female is presenting to the clinic today for an annual exam.     She is generally doing well.  She finished school in May and is very pleased about that.  She said that doing her schoolwork required a lot of sitting in a more sedentary lifestyle than she prefers.  Unfortunately, she lost her mother last October to a ruptured aortic aneurysm.  She was in the emergency department with her mother when this occurred.  Her mother had recently been diagnosed with atrial fibrillation and was on Eliquis.  She has a good support system but it is still hard losing her mother.  She found that her biological father also had an abdominal aortic aneurysm.  She is interested in being screened for this.  She is not fasting today but will return for fasting blood work.    Health Maintenance:  She has her eyes examined regularly and  "visits the dentist on a regular basis.   Mammogram: 5/23/18  Colonoscopy: n/a  Pap Smear: follows w/ gyn due to hx of abnl, had colposcopy    REVIEW OF SYSTEMS:   She denies nipple discharge and drainage.  All other systems are negative.    PFSH:  No past medical history on file.  Past Surgical History:   Procedure Laterality Date     VT KNEE SCOPE,DIAGNOSTIC      Description: Arthroscopy Knee Left;  Recorded: 03/25/2009;  Comments: [facility]     VT LAP,CHOLECYSTECTOMY      Description: Cholecystectomy Laparoscopic;  Recorded: 03/25/2009;  Comments: [facility]     Family History   Problem Relation Age of Onset     Breast cancer Mother 60     Aortic aneurysm Mother         arch that was repaired, abdomen rupture on eliquis that was fatal     Atrial fibrillation Mother      Social History     Socioeconomic History     Marital status:      Spouse name: Not on file     Number of children: Not on file     Years of education: Not on file     Highest education level: Not on file   Social Needs     Financial resource strain: Not on file     Food insecurity - worry: Not on file     Food insecurity - inability: Not on file     Transportation needs - medical: Not on file     Transportation needs - non-medical: Not on file   Occupational History     Not on file   Tobacco Use     Smoking status: Never Smoker     Smokeless tobacco: Never Used   Substance and Sexual Activity     Alcohol use: Not on file     Drug use: Not on file     Sexual activity: Not on file   Other Topics Concern     Not on file   Social History Narrative     Not on file       VITALS:  Vitals:    12/12/18 1423 12/12/18 1524   BP: (!) 130/100 (!) 132/100   Patient Site: Right Arm    Patient Position: Sitting    Cuff Size: Adult Large    Pulse: 84    Weight: 175 lb 14.4 oz (79.8 kg)    Height: 5' 4.25\" (1.632 m)      Wt Readings from Last 3 Encounters:   12/12/18 175 lb 14.4 oz (79.8 kg)   07/13/17 177 lb 7 oz (80.5 kg)   06/23/16 173 lb " (78.5 kg)       PHYSICAL EXAM:  Constitutional:  Reveals an alert, pleasant adult female.   Vitals:  Noted.   Head: Normocephalic, without obvious abnormality, atraumatic   Ears: TM's normal bilaterally, dry flaky skin in both external auditory canals  Eyes: PERRL, conjunctiva/corneas clear, EOM's intact   Throat: Lips, mucosa, and tongue normal; teeth and gums normal   Neck: Normal ROM, no carotid bruits, no thyromegaly   Lungs: Clear to auscultation bilaterally, respirations unlabored.   Breast exam:  Normal skin overlying her breasts bilaterally no discrete nodule is palpable in the axilla bilaterally  Heart: Regular rate and rhythm, S1 and S2 normal, no murmur, rub, or gallop,   Abdomen: Soft, non-tender, bowel sounds active all four quadrants, no masses, no organomegaly   Extremities: Extremities normal, atraumatic, no cyanosis or edema   Pelvic:Not examined  Skin: Skin color, texture, turgor normal, no rashes or lesions   Neurologic: Normal     MEDICATIONS:  Current Outpatient Medications   Medication Sig Dispense Refill     b complex vitamins tablet Take 1 tablet by mouth daily.       chlorthalidone (HYGROTEN) 25 MG tablet Take 1 tablet (25 mg total) by mouth daily. 90 tablet 3     ERGOCALCIFEROL, VITAMIN D2, (VITAMIN D2 ORAL) Take 1 capsule by mouth daily.       lisinopril (PRINIVIL,ZESTRIL) 10 MG tablet Take 1 tablet (10 mg total) by mouth daily. 90 tablet 3     MULTIVITAMIN ORAL Take 1 tablet by mouth daily.       OMEGA-3S/DHA/EPA/FISH OIL (OMEGA 3 ORAL) Take 1 capsule by mouth daily.       venlafaxine (EFFEXOR-XR) 150 MG 24 hr capsule Take 1 capsule (150 mg total) by mouth daily. 90 capsule 3     No current facility-administered medications for this visit.

## 2021-06-23 NOTE — PROGRESS NOTES
PATIENT INTAKE      ASSESSMENT:    1. Hypertension     2. Mixed hyperlipidemia     3. BMI 30.0-30.9,adult           PLAN    The patient attended group visit to learn about obesity as a disease, an approach to treatment and metabolic factors.  Nutrition counseling reviewed.    Labs ordered and will review and discuss with the patient.    Body composition analyzer done and results reviewed with the patient.  Please see scanned results in chart.    Discussed with the patient that phentermine is a pregnancy category X medication and that is is essential that a reliable form of birth control be used while taking this medication if the patient will be sexually active.  She expresses understanding.    The patient reports an excess of hunger and cravings as a reason why it is difficult to sustain a weight loss program.  We discussed weight loss medication today but the patient would like to start the program and discuss further at her follow-up visit depending on how she is doing.    Recommend journaling and tracking food intake using either an online program such as myfitnesspal.com or loseit.com or tracking using a paper and pencil.  Advised paying particular attention to total carbohydrates, fiber, protein, calories, and fats, and added sugars. Provided nutrition plan of: calories [id number]; protein 60-80gm, carbs 50-75 net gms.    Recommend increasing movement throughout the day--sitting less, moving more.  Will increase activity over time to reach a goal of at least 150 minutes of moderate exercise each week.    Behavior modification:  Cognitive restructuring exercises, journaling stressors, triggers for food cravings.    Dietary Intervention:  Reduced calorie, reduced carbohydrates, whole food diet.    Greater than 50% of this 45 minute visit was spent in counseling regarding obesity is a disease as well as the nutritional and exercise recommendations of our program as it pertains to the patient's own individual  healthcare needs.    Patient instructed to follow up in 1 month.      This note has been dictated using voice recognition software. Any grammatical or context distortions are unintentional and inherent to the software      SUBJECTIVE:      Patient presents for treatment of chronic, comorbid conditions using intensive therapeutic lifestyle interventions including nutrition, physical activity, and behavior management.  The patient is motivated to lose weight with the goal of achieving a healthier body mass index, lowering blood pressure, as well as improving her triglyceride level.  The patient has struggled with her weight for the past approximately 10 years.  Her weight affects her self-esteem, body image as well as libido.  She reports that she has been successful at losing weight previously but struggles to maintain those healthy habits.  She does have a history of some depression and anxiety as well as high cholesterol and high blood pressure.  She has a history of preeclampsia with 1 of her pregnancies.  The patient has cardiovascular risk factors and family history that includes diabetes in her mother and paternal grandmother, high cholesterol as well as high blood pressure and her grandmother as well as mother.  And a history of stroke in her grandmother.  She currently works as a registered nurse and reports that she enjoys reading, traveling and spending time with family in her free time.  Significant stressors in her life currently include resolving her mother's estate after she passed away 1-1/2 years ago and helping to take care of her older brother who has some special needs.  The patient lives with her , daughter who is 15 and a 12-year-old son.  She denies any history of physical or sexual abuse in her life.  She very occasionally drinks wine.  She reports that she does not skip meals but does to some extent rely on fast food.  She does not tend to eat sitting down and dinner table and  frequently needs to eat her food away from home.  She has tried multiple ways to lose weight including weight watchers perhaps 5 different times, Tonjaelmo Chatman which she did find to be the most successful, whole 30 as well as gym classes to only name a few she says.  She finds that feeling excessively hungry is 1 of the great challenges with being able to sustain a weight loss program.  She also finds that time constraints make it difficult for meal preparation.  Triggers for eating can include cravings and hunger primarily however when she is stressed she also will tend to eat comfort foods.  She finds that when she does get stressed she does not feel that her stomach tolerates healthy foods very well and again turns to comfort foods for this reason.  She does have advanced osteoarthritis of her left knee and this does limit her ability to exercise fully, however, she does enjoy multiple types of exercise and this includes running, walking, yoga and boot camp type classes.    What would you like to weigh (goal weight):  135  At what age were you last at that weight:  32  Any previous prescription weight loss medication:  No  Menses regular:  no  Number of children:  2  Are you pregnant: no  Are you currently using contraception: condoms  Do you exercise regularly:  Yes  Any problems with exercise:  Yes; arthritis knee  Do you eat nutritiously?  yes  Do you eat excessively?  yes  Do you count calories?  no  Do you snore at night or ever stop breathing? no  Have you been overweight all your life?  no  If not, how long?  Past 10 years  Do you have a history of eating disorder?  No  Have you ever had or been treated for alcohol or other substance abuse/dependence:   No    Patient Active Problem List   Diagnosis     Iron Deficiency Anemia Secondary To Inadequate Dietary Iron Intake     Eczema     Insomnia     Perimenopause     Arthritis     Hypertension     Mixed hyperlipidemia     Vitamin D Deficiency     Allergies      Chronic Sinusitis     Anxiety     BMI 30.0-30.9,adult       No past medical history on file.    Past Surgical History:   Procedure Laterality Date     IN KNEE SCOPE,DIAGNOSTIC      Description: Arthroscopy Knee Left;  Recorded: 03/25/2009;  Comments: [facility]87 [facility]     IN LAP,CHOLECYSTECTOMY      Description: Cholecystectomy Laparoscopic;  Recorded: 03/25/2009;  Comments: 6-07 [facility]       Current Outpatient Medications on File Prior to Visit   Medication Sig Dispense Refill     b complex vitamins tablet Take 1 tablet by mouth daily.       chlorthalidone (HYGROTEN) 25 MG tablet Take 1 tablet (25 mg total) by mouth daily. 90 tablet 3     ERGOCALCIFEROL, VITAMIN D2, (VITAMIN D2 ORAL) Take 1 capsule by mouth daily.       lisinopril (PRINIVIL,ZESTRIL) 10 MG tablet Take 1 tablet (10 mg total) by mouth daily. 90 tablet 3     MULTIVITAMIN ORAL Take 1 tablet by mouth daily.       OMEGA-3S/DHA/EPA/FISH OIL (OMEGA 3 ORAL) Take 1 capsule by mouth daily.       venlafaxine (EFFEXOR-XR) 150 MG 24 hr capsule Take 1 capsule (150 mg total) by mouth daily. 90 capsule 3     No current facility-administered medications on file prior to visit.        Allergies   Allergen Reactions     Morphine Nausea Only         Family History   Problem Relation Age of Onset     Breast cancer Mother 60     Aortic aneurysm Mother         arch that was repaired, abdomen rupture on eliquis that was fatal     Atrial fibrillation Mother      Family History also positive for  Stroke, Diabetes, High Cholesterol and Obesity    Social History     Socioeconomic History     Marital status:      Spouse name: Not on file     Number of children: Not on file     Years of education: Not on file     Highest education level: Not on file   Social Needs     Financial resource strain: Not on file     Food insecurity - worry: Not on file     Food insecurity - inability: Not on file     Transportation needs - medical: Not on file     Transportation needs -  non-medical: Not on file   Occupational History     Not on file   Tobacco Use     Smoking status: Never Smoker     Smokeless tobacco: Never Used   Substance and Sexual Activity     Alcohol use: Not on file     Drug use: Not on file     Sexual activity: Not on file   Other Topics Concern     Not on file   Social History Narrative     Not on file         ROS  A comprehensive review of systems was negative.  Pertinent items are noted in HPI.  Patient denies chest pain or pressure, shortness of breath, exertional intolerance, palpitations, or lightheadedness.      Vitals:    01/22/19 0845   BP: 118/76   Pulse: 74     Initial Weight: 176.8 lbs  Weight: 176 lb 12.8 oz (80.2 kg)  Weight loss from initial: 0  % Weight loss: 0 %  Waist Circumference: 38 inches  Neck Circumference: 14.75 inches    Body mass index is 30.35 kg/m .    EXAM                    General   Appearance:  Alert, pleasant, cooperative, no distress, appears stated age, patient is obese   Neck:   Supple, symmetrical, trachea midline, no adenopathy;     thyroid:  no enlargement/tenderness/nodules   Lungs:     Clear to auscultation bilaterally without wheezes, rales, or rhonchi, respirations unlabored    Heart:    Regular rate and rhythm, S1 and S2 normal, no murmur, rub   or gallop                                 Abdomen:  Soft, nontender, nondistended. No hepatosplenomegaly.          Extremities:  Warm, well perfused, no edema.           Skin:  Skin color, texture, and turgor normal.  No skin tags, striae, hirsutism, or acanthosis nigricans    Body composition analyzer done and results reviewed with the patient.  Please see scanned results in chart.  Labs ordered and will review and discuss with the patient.

## 2021-06-24 NOTE — PROGRESS NOTES
ASSESSMENT:  1. Hypertension     2. Mixed hyperlipidemia     3. BMI 30.0-30.9,adult           PLAN:  Follow up in 1 month.  Continue supplements.  This month concentrate on journaling and increasing exercise  Recommend increasing movement throughout the day--sitting less, moving more.  Will increase activity over time to reach a goal of at least 150 minutes of moderate exercise each week.  Behavior modification:  Cognitive restructuring exercises, journaling stressors, triggers for food cravings.  Dietary Intervention:  Reduced calorie, reduced carbohydrates, whole food diet.  Greater than 50% of this 15 minute visit was spent in counseling regarding patient's obesity, medications, dietary, exercise, and behavior modification recommendations.      SUBJECTIVE  Patient presents for treatment of chronic, comorbid conditions using intensive therapeutic lifestyle interventions including nutrition, physical activity, and behavior management.  The patient comes in today not pleased with her results for the month.  She states that she did pretty well initially for the first week and then lost motivation and focus.  She finds that for the most part she eats pretty healthy but then has snack foods and sweets that are off track.  She overall did well with exercise while on vacation but struggled more to get into a rhythm with her nutrition plan.  She does not feel that excessive appetite or cravings are getting in the way of her success.    Are you experiencing any side effects to the medications: N/A  Hunger control:  good  Exercise was discussed: yes; did well on vacation  Taking supplements:  yes  Discussed journaling food:  yes  Patient is pleased with the current results:  no  The patient is following the nutrition plan:  no  Barriers to losing weight:  Behavior:  social calories and Psychology:   not important (motivation issues, wrong time)    Patient Active Problem List   Diagnosis     Iron Deficiency Anemia Secondary  To Inadequate Dietary Iron Intake     Eczema     Insomnia     Perimenopause     Arthritis     Hypertension     Mixed hyperlipidemia     Vitamin D Deficiency     Allergies     Chronic Sinusitis     Anxiety     BMI 30.0-30.9,adult       Current Outpatient Medications on File Prior to Visit   Medication Sig Dispense Refill     b complex vitamins tablet Take 1 tablet by mouth daily.       chlorthalidone (HYGROTEN) 25 MG tablet Take 1 tablet (25 mg total) by mouth daily. 90 tablet 3     ERGOCALCIFEROL, VITAMIN D2, (VITAMIN D2 ORAL) Take 1 capsule by mouth daily.       lisinopril (PRINIVIL,ZESTRIL) 10 MG tablet Take 1 tablet (10 mg total) by mouth daily. 90 tablet 3     MULTIVITAMIN ORAL Take 1 tablet by mouth daily.       OMEGA-3S/DHA/EPA/FISH OIL (OMEGA 3 ORAL) Take 1 capsule by mouth daily.       venlafaxine (EFFEXOR-XR) 150 MG 24 hr capsule Take 1 capsule (150 mg total) by mouth daily. 90 capsule 3     No current facility-administered medications on file prior to visit.        The following portions of the patient's history were reviewed and updated as appropriate: allergies, current medications, past family history, past medical history, past social history, past surgical history and problem list.    Review of Systems  Pertinent items are noted in HPI.        OBJECTIVE:  Vitals:    02/25/19 1634   BP: 122/76     Initial Weight: 176.8 lbs  Weight: 177 lb 9.6 oz (80.6 kg)  Weight loss from initial: -0.8  % Weight loss: -0.45 %    Body mass index is 30.48 kg/m .  General:  Patient is alert, pleasant, no distress.  Patient is obese.       .  This note has been dictated using voice recognition software. Any grammatical or context distortions are unintentional and inherent to the software

## 2021-07-22 ENCOUNTER — RECORDS - HEALTHEAST (OUTPATIENT)
Dept: INTERNAL MEDICINE | Facility: CLINIC | Age: 51
End: 2021-07-22

## 2021-07-22 DIAGNOSIS — Z12.31 OTHER SCREENING MAMMOGRAM: ICD-10-CM

## 2021-10-02 ENCOUNTER — HEALTH MAINTENANCE LETTER (OUTPATIENT)
Age: 51
End: 2021-10-02

## 2022-07-03 ENCOUNTER — HEALTH MAINTENANCE LETTER (OUTPATIENT)
Age: 52
End: 2022-07-03

## 2023-01-14 ENCOUNTER — HEALTH MAINTENANCE LETTER (OUTPATIENT)
Age: 53
End: 2023-01-14

## 2023-07-22 ENCOUNTER — HEALTH MAINTENANCE LETTER (OUTPATIENT)
Age: 53
End: 2023-07-22

## 2024-07-23 ENCOUNTER — TRANSFERRED RECORDS (OUTPATIENT)
Dept: HEALTH INFORMATION MANAGEMENT | Facility: CLINIC | Age: 54
End: 2024-07-23
Payer: COMMERCIAL

## 2024-07-30 ENCOUNTER — ANESTHESIA EVENT (OUTPATIENT)
Dept: SURGERY | Facility: CLINIC | Age: 54
End: 2024-07-30
Payer: COMMERCIAL

## 2024-07-30 ENCOUNTER — HOSPITAL ENCOUNTER (OUTPATIENT)
Facility: CLINIC | Age: 54
Discharge: HOME | End: 2024-07-30
Attending: OBSTETRICS & GYNECOLOGY | Admitting: OBSTETRICS & GYNECOLOGY
Payer: COMMERCIAL

## 2024-07-30 ENCOUNTER — ANESTHESIA (OUTPATIENT)
Dept: SURGERY | Facility: CLINIC | Age: 54
End: 2024-07-30
Payer: COMMERCIAL

## 2024-07-30 VITALS
RESPIRATION RATE: 13 BRPM | WEIGHT: 172 LBS | DIASTOLIC BLOOD PRESSURE: 77 MMHG | HEART RATE: 81 BPM | HEIGHT: 64 IN | BODY MASS INDEX: 29.37 KG/M2 | TEMPERATURE: 97.2 F | OXYGEN SATURATION: 94 % | SYSTOLIC BLOOD PRESSURE: 133 MMHG

## 2024-07-30 LAB — HCG UR QL: NEGATIVE

## 2024-07-30 PROCEDURE — 272N000001 HC OR GENERAL SUPPLY STERILE: Performed by: OBSTETRICS & GYNECOLOGY

## 2024-07-30 PROCEDURE — 258N000001 HC RX 258: Performed by: OBSTETRICS & GYNECOLOGY

## 2024-07-30 PROCEDURE — 258N000003 HC RX IP 258 OP 636: Performed by: ANESTHESIOLOGY

## 2024-07-30 PROCEDURE — 81025 URINE PREGNANCY TEST: CPT | Performed by: OBSTETRICS & GYNECOLOGY

## 2024-07-30 PROCEDURE — 710N000012 HC RECOVERY PHASE 2, PER MINUTE: Performed by: OBSTETRICS & GYNECOLOGY

## 2024-07-30 PROCEDURE — 370N000017 HC ANESTHESIA TECHNICAL FEE, PER MIN: Performed by: OBSTETRICS & GYNECOLOGY

## 2024-07-30 PROCEDURE — 360N000076 HC SURGERY LEVEL 3, PER MIN: Performed by: OBSTETRICS & GYNECOLOGY

## 2024-07-30 PROCEDURE — 250N000009 HC RX 250: Performed by: OBSTETRICS & GYNECOLOGY

## 2024-07-30 PROCEDURE — 250N000009 HC RX 250: Performed by: NURSE ANESTHETIST, CERTIFIED REGISTERED

## 2024-07-30 PROCEDURE — 250N000011 HC RX IP 250 OP 636: Performed by: NURSE ANESTHETIST, CERTIFIED REGISTERED

## 2024-07-30 PROCEDURE — 250N000013 HC RX MED GY IP 250 OP 250 PS 637: Performed by: OBSTETRICS & GYNECOLOGY

## 2024-07-30 PROCEDURE — 999N000141 HC STATISTIC PRE-PROCEDURE NURSING ASSESSMENT: Performed by: OBSTETRICS & GYNECOLOGY

## 2024-07-30 PROCEDURE — 88305 TISSUE EXAM BY PATHOLOGIST: CPT | Mod: TC | Performed by: OBSTETRICS & GYNECOLOGY

## 2024-07-30 PROCEDURE — 258N000003 HC RX IP 258 OP 636: Performed by: NURSE ANESTHETIST, CERTIFIED REGISTERED

## 2024-07-30 PROCEDURE — 88305 TISSUE EXAM BY PATHOLOGIST: CPT | Mod: 26 | Performed by: PATHOLOGY

## 2024-07-30 RX ORDER — VITAMIN B COMPLEX
1 TABLET ORAL DAILY
COMMUNITY

## 2024-07-30 RX ORDER — PROPOFOL 10 MG/ML
INJECTION, EMULSION INTRAVENOUS PRN
Status: DISCONTINUED | OUTPATIENT
Start: 2024-07-30 | End: 2024-07-30

## 2024-07-30 RX ORDER — DEXAMETHASONE SODIUM PHOSPHATE 10 MG/ML
4 INJECTION, SOLUTION INTRAMUSCULAR; INTRAVENOUS
Status: DISCONTINUED | OUTPATIENT
Start: 2024-07-30 | End: 2024-07-30 | Stop reason: HOSPADM

## 2024-07-30 RX ORDER — VENLAFAXINE HYDROCHLORIDE 75 MG/1
75 TABLET, EXTENDED RELEASE ORAL DAILY
COMMUNITY

## 2024-07-30 RX ORDER — LIDOCAINE 40 MG/G
CREAM TOPICAL
Status: DISCONTINUED | OUTPATIENT
Start: 2024-07-30 | End: 2024-07-30 | Stop reason: HOSPADM

## 2024-07-30 RX ORDER — SODIUM CHLORIDE, SODIUM LACTATE, POTASSIUM CHLORIDE, CALCIUM CHLORIDE 600; 310; 30; 20 MG/100ML; MG/100ML; MG/100ML; MG/100ML
INJECTION, SOLUTION INTRAVENOUS CONTINUOUS PRN
Status: DISCONTINUED | OUTPATIENT
Start: 2024-07-30 | End: 2024-07-30

## 2024-07-30 RX ORDER — IBUPROFEN 400 MG/1
800 TABLET, FILM COATED ORAL ONCE
Status: DISCONTINUED | OUTPATIENT
Start: 2024-07-30 | End: 2024-07-30 | Stop reason: HOSPADM

## 2024-07-30 RX ORDER — KETOROLAC TROMETHAMINE 30 MG/ML
INJECTION, SOLUTION INTRAMUSCULAR; INTRAVENOUS PRN
Status: DISCONTINUED | OUTPATIENT
Start: 2024-07-30 | End: 2024-07-30

## 2024-07-30 RX ORDER — NALOXONE HYDROCHLORIDE 0.4 MG/ML
0.1 INJECTION, SOLUTION INTRAMUSCULAR; INTRAVENOUS; SUBCUTANEOUS
Status: DISCONTINUED | OUTPATIENT
Start: 2024-07-30 | End: 2024-07-30 | Stop reason: HOSPADM

## 2024-07-30 RX ORDER — LIDOCAINE HYDROCHLORIDE AND EPINEPHRINE 10; 10 MG/ML; UG/ML
INJECTION, SOLUTION INFILTRATION; PERINEURAL
Status: DISCONTINUED
Start: 2024-07-30 | End: 2024-07-30 | Stop reason: HOSPADM

## 2024-07-30 RX ORDER — SODIUM CHLORIDE, SODIUM LACTATE, POTASSIUM CHLORIDE, CALCIUM CHLORIDE 600; 310; 30; 20 MG/100ML; MG/100ML; MG/100ML; MG/100ML
INJECTION, SOLUTION INTRAVENOUS CONTINUOUS
Status: DISCONTINUED | OUTPATIENT
Start: 2024-07-30 | End: 2024-07-30 | Stop reason: HOSPADM

## 2024-07-30 RX ORDER — ONDANSETRON 4 MG/1
4 TABLET, ORALLY DISINTEGRATING ORAL EVERY 30 MIN PRN
Status: DISCONTINUED | OUTPATIENT
Start: 2024-07-30 | End: 2024-07-30 | Stop reason: HOSPADM

## 2024-07-30 RX ORDER — ONDANSETRON 2 MG/ML
4 INJECTION INTRAMUSCULAR; INTRAVENOUS EVERY 30 MIN PRN
Status: DISCONTINUED | OUTPATIENT
Start: 2024-07-30 | End: 2024-07-30 | Stop reason: HOSPADM

## 2024-07-30 RX ORDER — ACETAMINOPHEN 325 MG/1
975 TABLET ORAL ONCE
Status: COMPLETED | OUTPATIENT
Start: 2024-07-30 | End: 2024-07-30

## 2024-07-30 RX ORDER — OXYCODONE HYDROCHLORIDE 5 MG/1
5 TABLET ORAL
Status: DISCONTINUED | OUTPATIENT
Start: 2024-07-30 | End: 2024-07-30 | Stop reason: HOSPADM

## 2024-07-30 RX ORDER — FENTANYL CITRATE 50 UG/ML
INJECTION, SOLUTION INTRAMUSCULAR; INTRAVENOUS PRN
Status: DISCONTINUED | OUTPATIENT
Start: 2024-07-30 | End: 2024-07-30

## 2024-07-30 RX ORDER — LIDOCAINE HYDROCHLORIDE 10 MG/ML
INJECTION, SOLUTION INFILTRATION; PERINEURAL PRN
Status: DISCONTINUED | OUTPATIENT
Start: 2024-07-30 | End: 2024-07-30

## 2024-07-30 RX ORDER — ACETAMINOPHEN 325 MG/1
975 TABLET ORAL ONCE
Status: DISCONTINUED | OUTPATIENT
Start: 2024-07-30 | End: 2024-07-30 | Stop reason: HOSPADM

## 2024-07-30 RX ORDER — ONDANSETRON 2 MG/ML
INJECTION INTRAMUSCULAR; INTRAVENOUS PRN
Status: DISCONTINUED | OUTPATIENT
Start: 2024-07-30 | End: 2024-07-30

## 2024-07-30 RX ORDER — LIDOCAINE HYDROCHLORIDE 10 MG/ML
INJECTION, SOLUTION INFILTRATION; PERINEURAL PRN
Status: DISCONTINUED | OUTPATIENT
Start: 2024-07-30 | End: 2024-07-30 | Stop reason: HOSPADM

## 2024-07-30 RX ORDER — DEXAMETHASONE SODIUM PHOSPHATE 4 MG/ML
INJECTION, SOLUTION INTRA-ARTICULAR; INTRALESIONAL; INTRAMUSCULAR; INTRAVENOUS; SOFT TISSUE PRN
Status: DISCONTINUED | OUTPATIENT
Start: 2024-07-30 | End: 2024-07-30

## 2024-07-30 RX ORDER — LIDOCAINE HYDROCHLORIDE 10 MG/ML
INJECTION, SOLUTION EPIDURAL; INFILTRATION; INTRACAUDAL; PERINEURAL
Status: DISCONTINUED
Start: 2024-07-30 | End: 2024-07-30 | Stop reason: WASHOUT

## 2024-07-30 RX ORDER — OXYCODONE HYDROCHLORIDE 5 MG/1
10 TABLET ORAL
Status: DISCONTINUED | OUTPATIENT
Start: 2024-07-30 | End: 2024-07-30 | Stop reason: HOSPADM

## 2024-07-30 RX ORDER — PROPOFOL 10 MG/ML
INJECTION, EMULSION INTRAVENOUS CONTINUOUS PRN
Status: DISCONTINUED | OUTPATIENT
Start: 2024-07-30 | End: 2024-07-30

## 2024-07-30 RX ADMIN — KETOROLAC TROMETHAMINE 15 MG: 30 INJECTION, SOLUTION INTRAMUSCULAR at 16:22

## 2024-07-30 RX ADMIN — FENTANYL CITRATE 50 MCG: 50 INJECTION INTRAMUSCULAR; INTRAVENOUS at 16:19

## 2024-07-30 RX ADMIN — FENTANYL CITRATE 50 MCG: 50 INJECTION INTRAMUSCULAR; INTRAVENOUS at 15:57

## 2024-07-30 RX ADMIN — PROPOFOL 50 MG: 10 INJECTION, EMULSION INTRAVENOUS at 15:57

## 2024-07-30 RX ADMIN — ONDANSETRON 4 MG: 2 INJECTION INTRAMUSCULAR; INTRAVENOUS at 16:22

## 2024-07-30 RX ADMIN — SODIUM CHLORIDE, POTASSIUM CHLORIDE, SODIUM LACTATE AND CALCIUM CHLORIDE: 600; 310; 30; 20 INJECTION, SOLUTION INTRAVENOUS at 13:48

## 2024-07-30 RX ADMIN — PROPOFOL 150 MCG/KG/MIN: 10 INJECTION, EMULSION INTRAVENOUS at 15:57

## 2024-07-30 RX ADMIN — SODIUM CHLORIDE, POTASSIUM CHLORIDE, SODIUM LACTATE AND CALCIUM CHLORIDE: 600; 310; 30; 20 INJECTION, SOLUTION INTRAVENOUS at 15:53

## 2024-07-30 RX ADMIN — ACETAMINOPHEN 975 MG: 325 TABLET ORAL at 13:15

## 2024-07-30 RX ADMIN — DEXMEDETOMIDINE HYDROCHLORIDE 12 MCG: 100 INJECTION, SOLUTION INTRAVENOUS at 16:01

## 2024-07-30 RX ADMIN — MIDAZOLAM 2 MG: 1 INJECTION INTRAMUSCULAR; INTRAVENOUS at 15:53

## 2024-07-30 RX ADMIN — LIDOCAINE HYDROCHLORIDE 30 MG: 10 INJECTION, SOLUTION INFILTRATION; PERINEURAL at 15:57

## 2024-07-30 RX ADMIN — DEXAMETHASONE SODIUM PHOSPHATE 10 MG: 4 INJECTION, SOLUTION INTRA-ARTICULAR; INTRALESIONAL; INTRAMUSCULAR; INTRAVENOUS; SOFT TISSUE at 16:22

## 2024-07-30 ASSESSMENT — ACTIVITIES OF DAILY LIVING (ADL)
ADLS_ACUITY_SCORE: 18
ADLS_ACUITY_SCORE: 18
ADLS_ACUITY_SCORE: 33
ADLS_ACUITY_SCORE: 18

## 2024-07-30 NOTE — OP NOTE
Operative Note    Patient: Cuca Sun  : 1970  MRN: [id number]    Date of Service: 2024    Pre-operative diagnosis:  55yo perimenopausal P2 with AUB  Endometrial polyp on pelvic US    Post-operative diagnosis:  Same     Procedure:   Diagnostic hysteroscopy  Endometrial polypectomy with morcellator  Mirena IUD placement     Surgeon: Rivas Phipps MD    Anesthesia: Local with MAC    EBL: <5 mL    Specimens: endometrial curetting   Complications: none    Findings: EUA revealed normal cervix and small retroverted uterus, no adnexal masses.  Uterine cavity appeared proliferative with a polypoid thickening on the anterior wall left of center.     Indications: Cuca Sun is a 54 year old female with AUB and likely endometrial polyp. A diagnostic hysteroscopy was recommended. Risks, benefits, and alternatives to the procedure were discussed. The patient's questions were answered, understanding confirmed, and the patient signed written informed consent.    Technique: The patient was taken to the operating room where she was placed in the dorsal lithotomy position with feet in yellow fin stirrups. The patient was placed under MAC anesthesia. An exam under anesthesia was perfromed. The patient was prepped and draped in the usual sterile fashion. A speculum was placed in the vagina and the cervix visualized. A single-toothed tenaculum was placed on the anterior lip of the cervix at 12 o'clock. A paracervical block was then placed with 20 ml of 1% lidocaine with epi at 4 & 8 o'clock. The cervical os was carefully dilated to 7 mm with sequential dilators. The operating hysteroscope was placed through the cervix into the uterine cavity. Examination of the uterine cavity demonstrated findings noted above. The remainder of the cavity was unremarkable. Pictures were taken. The reciprocating hysteroscopic morcellator was used to remove the polypoid tissue with good success. Pictures were taken.  The hysteroscope apparatus was removed. The curetings were sent to pathology. The Mirena IUD was placed with strings cut to 2cm. The tenaculum was removed from the cervix and good hemostasis was noted. The speculum was removed from the vagina.    Instrument counts were correct x2. The patient was awoken in the OR and transferred to the PACU in stable condition.    Rivas Phipps MD  7/30/2024 4:30 PM

## 2024-07-30 NOTE — ANESTHESIA CARE TRANSFER NOTE
Patient: Cuca Sun    Procedure: Procedure(s):  DIAGNOSTIC HYSTEROSCOPY, POLYPECTOMY WITH MORCELLATOR  MIRENA INTRAUTERINE DEVICE INSERTION       Diagnosis: Abnormal uterine bleeding [N93.9]  Endometrial polyp [N84.0]  Diagnosis Additional Information: No value filed.    Anesthesia Type:   MAC     Note:    Oropharynx: oropharynx clear of all foreign objects  Level of Consciousness: awake  Oxygen Supplementation: face mask  Level of Supplemental Oxygen (L/min / FiO2): 8  Independent Airway: airway patency satisfactory and stable  Dentition: dentition unchanged  Vital Signs Stable: post-procedure vital signs reviewed and stable  Report to RN Given: handoff report given  Patient transferred to: Phase II    Handoff Report: Identifed the Patient, Identified the Reponsible Provider, Reviewed the pertinent medical history, Discussed the surgical course, Reviewed Intra-OP anesthesia mangement and issues during anesthesia, Set expectations for post-procedure period and Allowed opportunity for questions and acknowledgement of understanding      Vitals:  Vitals Value Taken Time   /65 07/30/24 1634   Temp 36.4  C (97.6  F) 07/30/24 1634   Pulse 85 07/30/24 1634   Resp 16 07/30/24 1634   SpO2 100 % 07/30/24 1634       Electronically Signed By: MIKI Rodriguez CRNA  July 30, 2024  4:37 PM

## 2024-07-30 NOTE — ANESTHESIA POSTPROCEDURE EVALUATION
Patient: Cuca Sun    Procedure: Procedure(s):  DIAGNOSTIC HYSTEROSCOPY, POLYPECTOMY WITH MORCELLATOR  MIRENA INTRAUTERINE DEVICE INSERTION       Anesthesia Type:  MAC    Note:  Disposition: Outpatient   Postop Pain Control: Uneventful            Sign Out: Well controlled pain   PONV: No   Neuro/Psych: Uneventful            Sign Out: Acceptable/Baseline neuro status   Airway/Respiratory: Uneventful            Sign Out: Acceptable/Baseline resp. status   CV/Hemodynamics: Uneventful            Sign Out: Acceptable CV status; No obvious hypovolemia; No obvious fluid overload   Other NRE: NONE   DID A NON-ROUTINE EVENT OCCUR? No           Last vitals:  Vitals Value Taken Time   /77 07/30/24 1700   Temp 35.9  C (96.62  F) 07/30/24 1722   Pulse 92 07/30/24 1725   Resp 29 07/30/24 1717   SpO2 84 % 07/30/24 1725   Vitals shown include unfiled device data.    Electronically Signed By: Rayray Campbell MD  July 30, 2024  6:11 PM

## 2024-07-30 NOTE — PHARMACY-ADMISSION MEDICATION HISTORY
Pharmacist Admission Medication History    Admission medication history is complete. The information provided in this note is only as accurate as the sources available at the time of the update.    Information Source(s): Patient and CareEverywhere/SureScripts via in-person    Pertinent Information:      Changes made to PTA medication list:  Added: None  Deleted: None  Changed: None    Allergies reviewed with patient and updates made in EHR: yes    Medication History Completed By: Ray Anderson KERMIT 7/30/2024 1:33 PM    PTA Med List   Medication Sig Last Dose    b complex vitamins tablet [B COMPLEX VITAMINS TABLET] Take 1 tablet by mouth daily. 7/23/2024 at am    chlorthalidone (HYGROTEN) 25 MG tablet [CHLORTHALIDONE (HYGROTEN) 25 MG TABLET] Take 1 tablet (25 mg total) by mouth daily. 7/30/2024 at am    lisinopril (PRINIVIL,ZESTRIL) 10 MG tablet [LISINOPRIL (PRINIVIL,ZESTRIL) 10 MG TABLET] TAKE 1 TABLET(10 MG) BY MOUTH DAILY 7/30/2024 at am    MULTIVITAMIN ORAL [MULTIVITAMIN ORAL] Take 1 tablet by mouth daily. 7/24/2024 at am    OMEGA-3S/DHA/EPA/FISH OIL (OMEGA 3 ORAL) [OMEGA-3S/DHA/EPA/FISH OIL (OMEGA 3 ORAL)] Take 1 capsule by mouth daily. 7/24/2024 at am    venlafaxine (EFFEXOR-ER) 75 MG 24 hr tablet Take 75 mg by mouth daily 7/30/2024 at am    Vitamin D3 (CHOLECALCIFEROL) 25 mcg (1000 units) tablet Take 1 tablet by mouth daily 7/23/2024 at am

## 2024-07-30 NOTE — ANESTHESIA PREPROCEDURE EVALUATION
Anesthesia Pre-Procedure Evaluation    Patient: Cuca Sun   MRN: [id number] : 1970        Procedure : Procedure(s):  DIAGNOSTIC HYSTEROSCOPY, POLYPECTOMY WITH MORCELLATOR  MIRENA INTRAUTERINE DEVICE INSERTION          Past Medical History:   Diagnosis Date    Anxiety     Motion sickness     PONV (postoperative nausea and vomiting)     Sleep apnea       Past Surgical History:   Procedure Laterality Date    HC KNEE SCOPE, DIAGNOSTIC      Description: Arthroscopy Knee Left;  Recorded: 2009;  Comments:  [facility] LAP,CHOLECYSTECTOMY/EXPLORE      Description: Cholecystectomy Laparoscopic;  Recorded: 2009;  Comments:  [facility]      Allergies   Allergen Reactions    Morphine Nausea      Social History     Tobacco Use    Smoking status: Never    Smokeless tobacco: Never   Substance Use Topics    Alcohol use: Yes     Comment: 2 a week      Wt Readings from Last 1 Encounters:   24 78 kg (172 lb)        Anesthesia Evaluation   Pt has had prior anesthetic.     History of anesthetic complications  - PONV.      ROS/MED HX  ENT/Pulmonary:     (+) sleep apnea,                                       Neurologic:  - neg neurologic ROS     Cardiovascular:     (+)  hypertension- -   -  - -                                      METS/Exercise Tolerance:     Hematologic:  - neg hematologic  ROS     Musculoskeletal:       GI/Hepatic:  - neg GI/hepatic ROS     Renal/Genitourinary:  - neg Renal ROS     Endo:  - neg endo ROS     Psychiatric/Substance Use:       Infectious Disease:       Malignancy:       Other:            Physical Exam    Airway        Mallampati: II   TM distance: > 3 FB   Neck ROM: full   Mouth opening: > 3 cm    Respiratory Devices and Support         Dental       (+) Minor Abnormalities - some fillings, tiny chips      Cardiovascular   cardiovascular exam normal          Pulmonary   pulmonary exam normal                OUTSIDE LABS:  CBC: No results found for:  "\"WBC\", \"HGB\", \"HCT\", \"PLT\"  BMP:   Lab Results   Component Value Date     01/22/2019    POTASSIUM 3.4 (L) 01/22/2019    CHLORIDE 97 (L) 01/22/2019    CO2 26 01/22/2019    BUN 17 01/22/2019    CR 0.69 01/22/2019    GLC 87 01/22/2019     COAGS: No results found for: \"PTT\", \"INR\", \"FIBR\"  POC: No results found for: \"BGM\", \"HCG\", \"HCGS\"  HEPATIC:   Lab Results   Component Value Date    ALBUMIN 4.2 01/22/2019    PROTTOTAL 7.3 01/22/2019    ALT 26 01/22/2019    AST 19 01/22/2019    ALKPHOS 64 01/22/2019    BILITOTAL 0.5 01/22/2019     OTHER:   Lab Results   Component Value Date    A1C 5.6 01/22/2019    LUH 9.7 01/22/2019    TSH 2.40 01/22/2019       Anesthesia Plan    ASA Status:  2    NPO Status:  NPO Appropriate    Anesthesia Type: MAC.              Consents    Anesthesia Plan(s) and associated risks, benefits, and realistic alternatives discussed. Questions answered and patient/representative(s) expressed understanding.     - Discussed: Risks, Benefits and Alternatives for the PROCEDURE were discussed     - Discussed with:  Patient            Postoperative Care    Pain management: IV analgesics, Oral pain medications.   PONV prophylaxis: Ondansetron (or other 5HT-3), Dexamethasone or Solumedrol     Comments:               Rayray Flores MD    I have reviewed the pertinent notes and labs in the chart from the past 30 days and (re)examined the patient.  Any updates or changes from those notes are reflected in this note.              # Overweight: Estimated body mass index is 29.52 kg/m  as calculated from the following:    Height as of this encounter: 1.626 m (5' 4\").    Weight as of this encounter: 78 kg (172 lb).      "

## 2024-08-02 LAB
PATH REPORT.COMMENTS IMP SPEC: NORMAL
PATH REPORT.COMMENTS IMP SPEC: NORMAL
PATH REPORT.FINAL DX SPEC: NORMAL
PATH REPORT.GROSS SPEC: NORMAL
PATH REPORT.MICROSCOPIC SPEC OTHER STN: NORMAL
PATH REPORT.RELEVANT HX SPEC: NORMAL
PHOTO IMAGE: NORMAL

## 2024-09-08 ENCOUNTER — HEALTH MAINTENANCE LETTER (OUTPATIENT)
Age: 54
End: 2024-09-08

## 2024-10-31 ENCOUNTER — TRANSFERRED RECORDS (OUTPATIENT)
Dept: HEALTH INFORMATION MANAGEMENT | Facility: CLINIC | Age: 54
End: 2024-10-31
Payer: COMMERCIAL

## 2024-11-05 ENCOUNTER — TRANSFERRED RECORDS (OUTPATIENT)
Dept: HEALTH INFORMATION MANAGEMENT | Facility: CLINIC | Age: 54
End: 2024-11-05
Payer: COMMERCIAL

## 2024-12-03 ENCOUNTER — TRANSFERRED RECORDS (OUTPATIENT)
Dept: HEALTH INFORMATION MANAGEMENT | Facility: CLINIC | Age: 54
End: 2024-12-03
Payer: COMMERCIAL

## 2024-12-12 ENCOUNTER — TRANSFERRED RECORDS (OUTPATIENT)
Dept: HEALTH INFORMATION MANAGEMENT | Facility: CLINIC | Age: 54
End: 2024-12-12
Payer: COMMERCIAL

## 2024-12-18 ENCOUNTER — TRANSFERRED RECORDS (OUTPATIENT)
Dept: HEALTH INFORMATION MANAGEMENT | Facility: CLINIC | Age: 54
End: 2024-12-18
Payer: COMMERCIAL

## 2025-01-05 ENCOUNTER — HEALTH MAINTENANCE LETTER (OUTPATIENT)
Age: 55
End: 2025-01-05

## (undated) DEVICE — PAD SANITARY ADHES BACK 11IN NS 1380A

## (undated) DEVICE — GLOVE BIOGEL PI ULTRATOUCH G SZ 8.0 42180

## (undated) DEVICE — PACK MINOR SINGLE BASIN SSK3001

## (undated) DEVICE — GLOVE BIOGEL PI INDICATOR 8.0 LF 41680

## (undated) DEVICE — SOL NACL 0.9% IRRIG 1000ML BOTTLE 2F7124

## (undated) DEVICE — BRIEF STRETCH XL MPS40

## (undated) DEVICE — KIT PROCEDURE FLUENT IN/OUT FLOWPAK TISS TRAP FLT-112S

## (undated) DEVICE — CUSTOM PACK PERI GYN SMA5BPGHEA

## (undated) DEVICE — SOLUTION IRRIG 2B7127 .9NS 3000ML BAG

## (undated) DEVICE — JELLY LUBRICATING SURGILUBE 2OZ TUBE

## (undated) DEVICE — PREP DYNA-HEX 4% CHG SCRUB 4OZ BOTTLE MDS098710

## (undated) DEVICE — SOL WATER IRRIG 1000ML BOTTLE 2F7114

## (undated) DEVICE — DRSG TELFA 3X4" 1050

## (undated) DEVICE — Device

## (undated) DEVICE — SEAL SET MYOSURE ROD LENS SCOPE SINGLE USE 40-902

## (undated) RX ORDER — FENTANYL CITRATE 50 UG/ML
INJECTION, SOLUTION INTRAMUSCULAR; INTRAVENOUS
Status: DISPENSED
Start: 2024-07-30

## (undated) RX ORDER — PROPOFOL 10 MG/ML
INJECTION, EMULSION INTRAVENOUS
Status: DISPENSED
Start: 2024-07-30

## (undated) RX ORDER — KETOROLAC TROMETHAMINE 30 MG/ML
INJECTION, SOLUTION INTRAMUSCULAR; INTRAVENOUS
Status: DISPENSED
Start: 2024-07-30

## (undated) RX ORDER — LIDOCAINE HYDROCHLORIDE 10 MG/ML
INJECTION, SOLUTION EPIDURAL; INFILTRATION; INTRACAUDAL; PERINEURAL
Status: DISPENSED
Start: 2024-07-30

## (undated) RX ORDER — ONDANSETRON 2 MG/ML
INJECTION INTRAMUSCULAR; INTRAVENOUS
Status: DISPENSED
Start: 2024-07-30